# Patient Record
Sex: MALE | Race: WHITE
[De-identification: names, ages, dates, MRNs, and addresses within clinical notes are randomized per-mention and may not be internally consistent; named-entity substitution may affect disease eponyms.]

---

## 2018-08-28 ENCOUNTER — RECORD ABSTRACTING (OUTPATIENT)
Age: 58
End: 2018-08-28

## 2018-08-28 DIAGNOSIS — Z79.4 LONG TERM (CURRENT) USE OF INSULIN: ICD-10-CM

## 2018-08-28 DIAGNOSIS — Z72.3 LACK OF PHYSICAL EXERCISE: ICD-10-CM

## 2018-08-28 DIAGNOSIS — Z83.3 FAMILY HISTORY OF DIABETES MELLITUS: ICD-10-CM

## 2018-08-28 DIAGNOSIS — Z80.9 FAMILY HISTORY OF MALIGNANT NEOPLASM, UNSPECIFIED: ICD-10-CM

## 2018-08-28 LAB
HBA1C MFR BLD: 7.8
PODIATRY EVAL: NORMAL

## 2018-08-28 RX ORDER — BLOOD-GLUCOSE METER
W/DEVICE EACH MISCELLANEOUS
Refills: 0 | Status: ACTIVE | COMMUNITY

## 2018-08-31 ENCOUNTER — RX RENEWAL (OUTPATIENT)
Age: 58
End: 2018-08-31

## 2018-09-13 ENCOUNTER — APPOINTMENT (OUTPATIENT)
Dept: ENDOCRINOLOGY | Facility: CLINIC | Age: 58
End: 2018-09-13
Payer: MEDICARE

## 2018-09-13 VITALS
HEART RATE: 79 BPM | SYSTOLIC BLOOD PRESSURE: 124 MMHG | DIASTOLIC BLOOD PRESSURE: 80 MMHG | BODY MASS INDEX: 38.76 KG/M2 | HEIGHT: 75.5 IN | WEIGHT: 315 LBS

## 2018-09-13 DIAGNOSIS — Z87.39 PERSONAL HISTORY OF OTHER DISEASES OF THE MUSCULOSKELETAL SYSTEM AND CONNECTIVE TISSUE: ICD-10-CM

## 2018-09-13 DIAGNOSIS — M51.26 OTHER INTERVERTEBRAL DISC DISPLACEMENT, LUMBAR REGION: ICD-10-CM

## 2018-09-13 DIAGNOSIS — Z87.09 PERSONAL HISTORY OF OTHER DISEASES OF THE RESPIRATORY SYSTEM: ICD-10-CM

## 2018-09-13 DIAGNOSIS — Z87.19 PERSONAL HISTORY OF OTHER DISEASES OF THE DIGESTIVE SYSTEM: ICD-10-CM

## 2018-09-13 DIAGNOSIS — Z87.448 PERSONAL HISTORY OF OTHER DISEASES OF URINARY SYSTEM: ICD-10-CM

## 2018-09-13 DIAGNOSIS — R91.1 SOLITARY PULMONARY NODULE: ICD-10-CM

## 2018-09-13 DIAGNOSIS — I25.10 ATHEROSCLEROTIC HEART DISEASE OF NATIVE CORONARY ARTERY W/OUT ANGINA PECTORIS: ICD-10-CM

## 2018-09-13 LAB
CHOLEST SERPL-MCNC: 214
CREAT SERPL-MCNC: 0.81
GLUCOSE BLDC GLUCOMTR-MCNC: 223
GLUCOSE SERPL-MCNC: 225
HBA1C MFR BLD HPLC: 8.7
HDLC SERPL-MCNC: 36
LDLC SERPL DIRECT ASSAY-MCNC: 121
MICROALBUMIN/CREAT 24H UR-RTO: 396
TRIGL SERPL-MCNC: 396

## 2018-09-13 PROCEDURE — 99214 OFFICE O/P EST MOD 30 MIN: CPT | Mod: 25

## 2018-09-13 PROCEDURE — 82962 GLUCOSE BLOOD TEST: CPT

## 2018-09-13 RX ORDER — ALLOPURINOL 100 MG/1
100 TABLET ORAL
Refills: 0 | Status: DISCONTINUED | COMMUNITY
End: 2018-09-13

## 2018-09-13 RX ORDER — INSULIN ASPART 100 [IU]/ML
100 INJECTION, SOLUTION INTRAVENOUS; SUBCUTANEOUS
Refills: 0 | Status: DISCONTINUED | COMMUNITY
End: 2018-09-13

## 2018-09-13 RX ORDER — LIRAGLUTIDE 6 MG/ML
18 INJECTION SUBCUTANEOUS DAILY
Qty: 3 | Refills: 1 | Status: DISCONTINUED | COMMUNITY
Start: 2018-08-31 | End: 2018-09-13

## 2018-09-13 RX ORDER — INSULIN ADMIN. SUPPLIES
INSULIN PEN (EA) SUBCUTANEOUS
Refills: 0 | Status: DISCONTINUED | COMMUNITY
End: 2018-09-13

## 2018-10-11 ENCOUNTER — RX RENEWAL (OUTPATIENT)
Age: 58
End: 2018-10-11

## 2018-12-27 ENCOUNTER — RX RENEWAL (OUTPATIENT)
Age: 58
End: 2018-12-27

## 2019-01-07 ENCOUNTER — RX RENEWAL (OUTPATIENT)
Age: 59
End: 2019-01-07

## 2019-01-15 ENCOUNTER — RECORD ABSTRACTING (OUTPATIENT)
Age: 59
End: 2019-01-15

## 2019-01-22 ENCOUNTER — APPOINTMENT (OUTPATIENT)
Dept: ENDOCRINOLOGY | Facility: CLINIC | Age: 59
End: 2019-01-22

## 2019-02-12 ENCOUNTER — APPOINTMENT (OUTPATIENT)
Dept: ENDOCRINOLOGY | Facility: CLINIC | Age: 59
End: 2019-02-12
Payer: MEDICARE

## 2019-02-12 VITALS
HEART RATE: 80 BPM | DIASTOLIC BLOOD PRESSURE: 100 MMHG | WEIGHT: 315 LBS | HEIGHT: 75 IN | SYSTOLIC BLOOD PRESSURE: 150 MMHG | BODY MASS INDEX: 39.17 KG/M2

## 2019-02-12 VITALS — DIASTOLIC BLOOD PRESSURE: 90 MMHG | SYSTOLIC BLOOD PRESSURE: 140 MMHG

## 2019-02-12 LAB — GLUCOSE BLDC GLUCOMTR-MCNC: 152

## 2019-02-12 PROCEDURE — 99214 OFFICE O/P EST MOD 30 MIN: CPT | Mod: 25

## 2019-02-12 PROCEDURE — 82962 GLUCOSE BLOOD TEST: CPT

## 2019-02-12 RX ORDER — ERGOCALCIFEROL 1.25 MG/1
1.25 MG CAPSULE, LIQUID FILLED ORAL
Qty: 12 | Refills: 0 | Status: DISCONTINUED | COMMUNITY
Start: 2018-12-27 | End: 2019-02-12

## 2019-02-12 RX ORDER — LIRAGLUTIDE 6 MG/ML
18 INJECTION SUBCUTANEOUS
Refills: 0 | Status: DISCONTINUED | COMMUNITY
End: 2019-02-12

## 2019-02-12 RX ORDER — EMPAGLIFLOZIN 10 MG/1
10 TABLET, FILM COATED ORAL DAILY
Qty: 30 | Refills: 5 | Status: DISCONTINUED | COMMUNITY
Start: 2018-09-13 | End: 2019-02-12

## 2019-02-12 RX ORDER — CHOLECALCIFEROL (VITAMIN D3) 125 MCG
TABLET ORAL
Refills: 0 | Status: DISCONTINUED | COMMUNITY
End: 2019-02-12

## 2019-02-12 RX ORDER — HYDRALAZINE HYDROCHLORIDE 25 MG/1
25 TABLET ORAL
Refills: 0 | Status: DISCONTINUED | COMMUNITY
End: 2019-02-12

## 2019-02-12 RX ORDER — NIFEDIPINE 30 MG/1
30 TABLET, FILM COATED, EXTENDED RELEASE ORAL
Refills: 0 | Status: DISCONTINUED | COMMUNITY
End: 2019-02-12

## 2019-02-12 RX ORDER — SYRINGE AND NEEDLE,INSULIN,1ML 31 GX5/16"
SYRINGE, EMPTY DISPOSABLE MISCELLANEOUS
Refills: 0 | Status: DISCONTINUED | COMMUNITY
End: 2019-02-12

## 2019-02-12 RX ORDER — ERGOCALCIFEROL 1.25 MG/1
CAPSULE ORAL
Refills: 0 | Status: DISCONTINUED | COMMUNITY
End: 2019-02-12

## 2019-02-12 RX ORDER — PEN NEEDLE, DIABETIC 33 GX5/32"
NEEDLE, DISPOSABLE MISCELLANEOUS
Refills: 0 | Status: DISCONTINUED | COMMUNITY
End: 2019-02-12

## 2019-02-12 NOTE — REVIEW OF SYSTEMS
[Decreased Appetite] : ~L decreased appetite [Recent Weight Loss (___ Lbs)] : recent [unfilled] ~Ulb weight loss [SOB on Exertion] : shortness of breath during exertion [Polydipsia] : polydipsia [Fatigue] : no fatigue [Visual Field Defect] : no visual field defect [Blurry Vision] : no blurred vision [Dysphagia] : no dysphagia [Dysphonia] : no dysphonia [Neck Pain] : no neck pain [Chest Pain] : no chest pain [Palpitations] : no palpitations [Constipation] : no constipation [Diarrhea] : no diarrhea [Polyuria] : no polyuria [Dysuria] : no dysuria [Headache] : no headaches [Tremors] : no tremors [Depression] : no depression [Anxiety] : no anxiety [Cold Intolerance] : cold tolerant [Heat Intolerance] : heat tolerant [Easy Bruising] : no tendency for easy bruising [Swelling] : no swelling [FreeTextEntry5] : chest tightness come and goes, has appointment with Cardiology tomorrow [FreeTextEntry8] : on lasix

## 2019-02-12 NOTE — PHYSICAL EXAM
[Alert] : alert [Well Nourished] : well nourished [Well Developed] : well developed [EOMI] : extra ocular movement intact [Normal Hearing] : hearing was normal [Supple] : the neck was supple [No LAD] : no lymphadenopathy [Thyroid Not Enlarged] : the thyroid was not enlarged [No Thyroid Nodules] : there were no palpable thyroid nodules [Normal Rate and Effort] : normal respiratory rhythm and effort [No Accessory Muscle Use] : no accessory muscle use [Clear to Auscultation] : lungs were clear to auscultation bilaterally [Normal Rate] : heart rate was normal  [Normal S1, S2] : normal S1 and S2 [Regular Rhythm] : with a regular rhythm [Pedal Pulses Normal] : the pedal pulses are present [Normal Bowel Sounds] : normal bowel sounds [Not Tender] : non-tender [Soft] : abdomen soft [Normal Gait] : normal gait [Right Foot Was Examined] : right foot ~C was examined [Left Foot Was Examined] : left foot ~C was examined [Normal] : normal [#2 Diminished] : number 2 was diminished [No Tremors] : no tremors [Oriented x3] : oriented to person, place, and time [Normal Insight/Judgement] : insight and judgment were intact [Acanthosis Nigricans] : no acanthosis nigricans [#5 Diminished] : number 5 was normal [#4 Diminished] : number 4 was normal [#6 Diminished] : number 6 was normal [#7 Diminished] : number 7 was normal [#8 Diminished] : number 8 was normal [#9 Diminished] : number 9 was normal [de-identified] : 1+ edema in bilateral feet [de-identified] : obese [FreeTextEntry5] : 2 cm open ulcer on upper anterior foot  [FreeTextEntry6] : 1 st toe with 1 cm  closed ulcer

## 2019-02-12 NOTE — HISTORY OF PRESENT ILLNESS
[FreeTextEntry1] : Pt. reports 2 weeks ago he had a heart attack along with 3 stents placed at the time. He is currently being followed by cardiology. \par \par Quality: Type 2 DM\par Severity: moderate\par Duration: since \par Onset: fatigue, blurry vision\par Modifying Factors: Better with medication\par Associated Symptoms: neuropathy. nephropathy\par \par Current Regimen:\par Humalog gives 10 units 2 hours after meals if blood sugar is high\par Tresiba 80 units at HS\par Victoza 1.2 mg daily\par \par Self blood sugar monitorin-3 times a day, no meter, no logs\par per pt. blood sugar between 120-150\par \par exercise: none\par \par Diet:\par B- skips, banana\par L- skips\par D- chicken, veggie violeta\par Snacks- apple\par \par Date of last eye exam:  (-) diabetic retinopathy\par Date of last foot exam: 2018, appointment in 2 weeks\par Date of last flu vaccine: 2018\par Date of last Pneumovax: no

## 2019-02-12 NOTE — REASON FOR VISIT
[Follow-Up: _____] : a [unfilled] follow-up visit [Spouse] : spouse [FreeTextEntry1] : Type 2 DM, Hyperlipidemia, HTN, vitamin D Deficiency, Obesity, and Hashimoto's thyroiditis

## 2019-02-12 NOTE — ASSESSMENT
[FreeTextEntry1] : 57 y/o male with Type 2 DM, Hyperlipidemia, HTN, vitamin D Deficiency, Obesity, and Hashimoto's thyroiditis. Labs reviewed from 1/30/19 - chol 177, A1C 9.3%, \par \par Plan: \par Type 2 DM: start Humalog U 200 give 5 units before meals - sample given\par - ordered Novolog in place of Humalog r/t insurance coverage \par - continue Tresiba 80 units at bedtime and Victoza 1.2 mg daily\par - continue self BS monitoring\par - send in logs in 1 week\par - offered [employer] Professional, pt. refused\par \par Obesity: educated on healthy food choices\par - schedule appointment with CDE for diet education\par - encouraged to increase routine exercise\par \par Hyperlipidemia: monitor labs, continue Atorvastatin\par \par HTN: elevated repeat /90, pt. has appointment scheduled with Cardiologist tomorrow\par - continue current medication regimen\par \par vitamin D deficiency: monitor labs, continue vitamin D supplement \par   \par Hashimoto's thyroiditis: monitor TFTs, no medication recommended at this time \par \par Pt. is followed by podiatry for foot ulcers. \par \par Labs and follow up visit in 3 weeks.\par \par Plan reviewed with Dr. Reyes.

## 2019-02-13 ENCOUNTER — RX RENEWAL (OUTPATIENT)
Age: 59
End: 2019-02-13

## 2019-02-21 ENCOUNTER — RECORD ABSTRACTING (OUTPATIENT)
Age: 59
End: 2019-02-21

## 2019-03-05 ENCOUNTER — APPOINTMENT (OUTPATIENT)
Dept: ENDOCRINOLOGY | Facility: CLINIC | Age: 59
End: 2019-03-05
Payer: MEDICARE

## 2019-03-05 VITALS
WEIGHT: 315 LBS | DIASTOLIC BLOOD PRESSURE: 90 MMHG | HEART RATE: 93 BPM | SYSTOLIC BLOOD PRESSURE: 144 MMHG | HEIGHT: 75 IN | BODY MASS INDEX: 39.17 KG/M2

## 2019-03-05 DIAGNOSIS — Z00.00 ENCOUNTER FOR GENERAL ADULT MEDICAL EXAMINATION W/OUT ABNORMAL FINDINGS: ICD-10-CM

## 2019-03-05 LAB — GLUCOSE BLDC GLUCOMTR-MCNC: 157

## 2019-03-05 PROCEDURE — 99214 OFFICE O/P EST MOD 30 MIN: CPT | Mod: 25

## 2019-03-05 PROCEDURE — 82962 GLUCOSE BLOOD TEST: CPT

## 2019-03-05 PROCEDURE — 95250 CONT GLUC MNTR PHYS/QHP EQP: CPT

## 2019-03-05 RX ORDER — INSULIN LISPRO 200 [IU]/ML
200 INJECTION, SOLUTION SUBCUTANEOUS
Qty: 1 | Refills: 0 | Status: DISCONTINUED | OUTPATIENT
End: 2019-03-05

## 2019-03-05 RX ORDER — HYDRALAZINE HYDROCHLORIDE 50 MG/1
50 TABLET ORAL 3 TIMES DAILY
Refills: 0 | Status: ACTIVE | COMMUNITY

## 2019-03-05 RX ORDER — FLASH GLUCOSE SENSOR
KIT MISCELLANEOUS
Qty: 0 | Refills: 0 | Status: COMPLETED | OUTPATIENT
Start: 2019-03-05

## 2019-03-05 RX ORDER — ERGOCALCIFEROL 1.25 MG/1
1.25 MG CAPSULE ORAL
Refills: 0 | Status: DISCONTINUED | COMMUNITY
End: 2019-03-05

## 2019-03-05 RX ORDER — NIFEDIPINE 30 MG/1
30 TABLET, FILM COATED, EXTENDED RELEASE ORAL
Refills: 0 | Status: DISCONTINUED | COMMUNITY
End: 2019-03-05

## 2019-03-05 RX ORDER — CHOLECALCIFEROL (VITAMIN D3) 125 MCG
TABLET ORAL
Refills: 0 | Status: DISCONTINUED | COMMUNITY
End: 2019-03-05

## 2019-03-05 RX ORDER — CLONIDINE HYDROCHLORIDE 0.2 MG/1
0.2 TABLET ORAL
Refills: 0 | Status: DISCONTINUED | COMMUNITY
End: 2019-03-05

## 2019-03-05 RX ADMIN — Medication 0: at 00:00

## 2019-03-05 NOTE — HISTORY OF PRESENT ILLNESS
[FreeTextEntry1] : 19 - Pt. reports 2 weeks ago he had a heart attack along with 3 stents placed at the time. He is currently being followed by cardiology. \par \par 3/5/19 - Pt. reports feeling better. \par \par Quality: Type 2 DM\par Severity: moderate\par Duration: since \par Onset: fatigue, blurry vision\par Modifying Factors: Better with medication\par Associated Symptoms: neuropathy. nephropathy\par \par Current Regimen:\par Humalog gives 5-7 units if BS > 100\par Tresiba 80 units at HS\par Victoza 1.2 mg daily\par \par Self blood sugar monitorin-3 times a day,per logs\par before breakfast - 155, 125, 162, 138, 134, 123, 138, 104\par before lunch - 140\par before dinner - 109, 176, 135, 108\par after dinner - 168\par \par \par exercise: none\par \par Diet:\par B- skips, banana\par L- sometimes skips, salad \par D- chicken, veggie violeta\par Snacks- apple\par \par Date of last eye exam:  (-) diabetic retinopathy\par Date of last foot exam: 2018, appointment in 2 weeks\par Date of last flu vaccine: 2018\par Date of last Pneumovax: no

## 2019-03-05 NOTE — PHYSICAL EXAM
[Alert] : alert [No Acute Distress] : no acute distress [Well Nourished] : well nourished [Well Developed] : well developed [EOMI] : extra ocular movement intact [Normal Hearing] : hearing was normal [Supple] : the neck was supple [No LAD] : no lymphadenopathy [Thyroid Not Enlarged] : the thyroid was not enlarged [No Thyroid Nodules] : there were no palpable thyroid nodules [Normal Rate and Effort] : normal respiratory rhythm and effort [No Accessory Muscle Use] : no accessory muscle use [Clear to Auscultation] : lungs were clear to auscultation bilaterally [Normal Rate] : heart rate was normal  [Normal S1, S2] : normal S1 and S2 [Regular Rhythm] : with a regular rhythm [Pedal Pulses Normal] : the pedal pulses are present [Normal Bowel Sounds] : normal bowel sounds [Not Tender] : non-tender [Soft] : abdomen soft [Normal Gait] : normal gait [Acanthosis Nigricans] : no acanthosis nigricans [Right Foot Was Examined] : right foot ~C was examined [Left Foot Was Examined] : left foot ~C was examined [Normal] : normal [#6 Diminished] : number 6 was normal [#8 Diminished] : number 8 was normal [#2 Diminished] : number 2 was diminished [#4 Diminished] : number 4 was diminished [#5 Diminished] : number 5 was diminished [#7 Diminished] : number 7 was diminished [#9 Diminished] : number 9 was diminished [No Motor Deficits] : the motor exam was normal [No Tremors] : no tremors [Oriented x3] : oriented to person, place, and time [Normal Insight/Judgement] : insight and judgment were intact [Normal Mood] : the mood was normal [de-identified] : 1+ edema in bilateral feet [de-identified] : obese [FreeTextEntry5] : 2 cm open ulcer on upper anterior foot  [FreeTextEntry6] : 1 st toe with 1 cm  closed ulcer

## 2019-03-05 NOTE — REASON FOR VISIT
[Follow-Up: _____] : a [unfilled] follow-up visit [Spouse] : spouse [FreeTextEntry1] : Type 2 DM, vitamin D Deficiency, Hyperlipidemia, and HTN.

## 2019-03-05 NOTE — REVIEW OF SYSTEMS
[Fatigue] : fatigue [Decreased Appetite] : ~L decreased appetite [Recent Weight Loss (___ Lbs)] : recent [unfilled] ~Ulb weight loss [Chest Pain] : chest pain [Palpitations] : palpitations [SOB on Exertion] : shortness of breath during exertion [Visual Field Defect] : no visual field defect [Blurry Vision] : no blurred vision [Dysphagia] : no dysphagia [Dysphonia] : no dysphonia [Constipation] : no constipation [Diarrhea] : no diarrhea [Polyuria] : no polyuria [Dysuria] : no dysuria [Headache] : no headaches [Tremors] : no tremors [Depression] : no depression [Anxiety] : no anxiety [Polydipsia] : no polydipsia [Cold Intolerance] : cold tolerant [Heat Intolerance] : heat tolerant [Easy Bruising] : no tendency for easy bruising [Swelling] : no swelling [FreeTextEntry4] : posterior neck pain r/t statins  [FreeTextEntry5] : improving s/p 3 stents placements

## 2019-03-05 NOTE — ASSESSMENT
[FreeTextEntry1] : 57 y/o male with Type 2 DM, Hyperlipidemia, HTN, vitamin D Deficiency, Obesity, and Hashimoto's thyroiditis. Labs reviewed from 2/28/19 - chol 95, A1C 8.5%, vitamin D 18, , and Microalbumin 763\par \par Plan:  \par Microalbuminuria: referral given for nephrology consult\par  \par Type 2 DM: eliza professional placed \par - ordered eliza personal sensor and reader \par - continue Novolog give 5 units before meals \par - continue Tresiba 80 units at bedtime and Victoza 1.2 mg daily\par - continue self BS monitoring\par \par Obesity: educated on healthy food choices\par - encouraged to increase routine exercise\par \par Hyperlipidemia: monitor labs, continue Atorvastatin\par \par HTN: continue current medication regimen\par \par vitamin D deficiency: monitor labs, per pt. cardiologist does not want him taking vitamin D   \par   \par Hashimoto's thyroiditis: monitor TFTs, no medication recommended at this time \par \par Pt. is followed by podiatry for foot ulcers. \par \par Follow up visit in 2 weeks.

## 2019-03-11 ENCOUNTER — RX RENEWAL (OUTPATIENT)
Age: 59
End: 2019-03-11

## 2019-03-11 ENCOUNTER — CLINICAL ADVICE (OUTPATIENT)
Age: 59
End: 2019-03-11

## 2019-03-11 ENCOUNTER — MEDICATION RENEWAL (OUTPATIENT)
Age: 59
End: 2019-03-11

## 2019-03-21 ENCOUNTER — APPOINTMENT (OUTPATIENT)
Dept: ENDOCRINOLOGY | Facility: CLINIC | Age: 59
End: 2019-03-21
Payer: MEDICARE

## 2019-03-21 VITALS
OXYGEN SATURATION: 97 % | BODY MASS INDEX: 39.17 KG/M2 | DIASTOLIC BLOOD PRESSURE: 80 MMHG | HEIGHT: 75 IN | SYSTOLIC BLOOD PRESSURE: 142 MMHG | WEIGHT: 315 LBS | HEART RATE: 78 BPM

## 2019-03-21 LAB — GLUCOSE BLDC GLUCOMTR-MCNC: 161

## 2019-03-21 PROCEDURE — 99214 OFFICE O/P EST MOD 30 MIN: CPT | Mod: 25

## 2019-03-21 PROCEDURE — 95251 CONT GLUC MNTR ANALYSIS I&R: CPT

## 2019-03-21 NOTE — HISTORY OF PRESENT ILLNESS
[FreeTextEntry1] : 6 weeks ago pt. had a heart attack along with 3 stents placed at the time. He is currently being followed by cardiology. \par \par Quality: Type 2 DM\par Severity: moderate\par Duration: since \par Onset: fatigue, blurry vision\par Modifying Factors: Better with medication\par Associated Symptoms: neuropathy. nephropathy\par \par Current Regimen:\par Humalog gives 5-7 units if BS > 100\par Tresiba 80 units at HS\par Victoza 1.2 mg daily\par Glimepiride 4 mg BID\par Metformin  mg 2 tabs BID\par \par Self blood sugar monitorin-3 times a day,per logs\par before breakfast - 148, 126, 122, 115, 192, 184, 156\par before lunch - 132, 179, 122, 160, 150, 160\par before dinner - 100, 92, 132, 190\par after dinner - 97, 146, 171\par \par Admazely Professional download - average glucose 198, standard deviation 67.4, highs after dinner and overnights\par Pt. reports of having a large dinner and snacking before bed\par \par \par exercise: none\par \par Diet: same\par B- skips, banana\par L- sometimes skips, salad \par D- chicken, veggie violeta\par Snacks- apple\par \par Date of last eye exam:  (-) diabetic retinopathy\par Date of last foot exam: 2018, appointment in 2 weeks\par Date of last flu vaccine: 2018\par Date of last Pneumovax: no

## 2019-03-21 NOTE — REVIEW OF SYSTEMS
[Fatigue] : fatigue [Recent Weight Loss (___ Lbs)] : recent [unfilled] ~Ulb weight loss [Chest Pain] : chest pain [Palpitations] : palpitations [SOB on Exertion] : shortness of breath during exertion [Easy Bruising] : a tendency for easy bruising [Decreased Appetite] : appetite not decreased [Visual Field Defect] : no visual field defect [Blurry Vision] : no blurred vision [Dysphagia] : no dysphagia [Dysphonia] : no dysphonia [Neck Pain] : no neck pain [Constipation] : no constipation [Diarrhea] : no diarrhea [Polyuria] : no polyuria [Dysuria] : no dysuria [Headache] : no headaches [Tremors] : no tremors [Depression] : no depression [Anxiety] : no anxiety [Polydipsia] : no polydipsia [Cold Intolerance] : cold tolerant [Heat Intolerance] : heat tolerant [Swelling] : no swelling [FreeTextEntry5] : improved, followed by cardiology  [de-identified] : on Brilinta

## 2019-03-21 NOTE — PHYSICAL EXAM
[Alert] : alert [No Acute Distress] : no acute distress [Well Nourished] : well nourished [Well Developed] : well developed [EOMI] : extra ocular movement intact [Normal Hearing] : hearing was normal [Supple] : the neck was supple [No LAD] : no lymphadenopathy [Thyroid Not Enlarged] : the thyroid was not enlarged [No Thyroid Nodules] : there were no palpable thyroid nodules [Normal Rate and Effort] : normal respiratory rhythm and effort [No Accessory Muscle Use] : no accessory muscle use [Clear to Auscultation] : lungs were clear to auscultation bilaterally [Normal Rate] : heart rate was normal  [Normal S1, S2] : normal S1 and S2 [Regular Rhythm] : with a regular rhythm [Pedal Pulses Normal] : the pedal pulses are present [Normal Bowel Sounds] : normal bowel sounds [Not Tender] : non-tender [Soft] : abdomen soft [Normal Gait] : normal gait [Acanthosis Nigricans] : no acanthosis nigricans [Right Foot Was Examined] : right foot ~C was examined [Left Foot Was Examined] : left foot ~C was examined [Normal] : normal [#6 Diminished] : number 6 was normal [#8 Diminished] : number 8 was normal [#2 Diminished] : number 2 was diminished [#4 Diminished] : number 4 was diminished [#5 Diminished] : number 5 was diminished [#7 Diminished] : number 7 was diminished [#9 Diminished] : number 9 was diminished [No Motor Deficits] : the motor exam was normal [No Tremors] : no tremors [Oriented x3] : oriented to person, place, and time [Normal Insight/Judgement] : insight and judgment were intact [Normal Mood] : the mood was normal [de-identified] : 1+ edema in bilateral feet [de-identified] : obese [FreeTextEntry5] : 2 cm open ulcer on upper anterior foot  [FreeTextEntry6] : 1 st toe with 1 cm  closed ulcer

## 2019-03-21 NOTE — REASON FOR VISIT
[Follow-Up: _____] : a [unfilled] follow-up visit [Spouse] : spouse [FreeTextEntry1] : Type 2 DM, Hyperlipidemia, HTN, and Vitamin D Deficiency.

## 2019-03-21 NOTE — ASSESSMENT
[FreeTextEntry1] : 57 y/o male with Type 2 DM, Hyperlipidemia, HTN, vitamin D Deficiency, Obesity, and Hashimoto's thyroiditis. Labs from 2/28/19 - chol 95, A1C 8.5%, vitamin D 18, , and Microalbumin 763\par \par Plan:  \par Microalbuminuria: referral given for nephrology consult\par  \par Type 2 DM:\par - adjusted Novolog to 5 units before breakfast, 6 units before lunch and 8 units before dinner \par - continue Tresiba 80 units at bedtime, Victoza 1.2 mg daily, Glimepiride 4 mg BID\par Metformin  mg 2 tabs BID\par - continue self BS monitoring\par - send in logs in 1 week\par \par Obesity: educated on healthy food choices\par - encouraged to increase routine exercise\par \par Hyperlipidemia: monitor labs, continue Atorvastatin\par \par HTN: continue current medication regimen\par \par vitamin D deficiency: monitor labs, per pt. cardiologist does not want him taking vitamin D   \par   \par Hashimoto's thyroiditis: monitor TFTs, no medication recommended at this time \par \par Pt. is followed by podiatry for foot ulcers. \par \par Follow up visit in 2 months with Dr. Reyes

## 2019-03-27 ENCOUNTER — RX RENEWAL (OUTPATIENT)
Age: 59
End: 2019-03-27

## 2019-03-27 ENCOUNTER — MEDICATION RENEWAL (OUTPATIENT)
Age: 59
End: 2019-03-27

## 2019-04-03 ENCOUNTER — RX RENEWAL (OUTPATIENT)
Age: 59
End: 2019-04-03

## 2019-04-19 ENCOUNTER — RESULT REVIEW (OUTPATIENT)
Age: 59
End: 2019-04-19

## 2019-05-09 ENCOUNTER — APPOINTMENT (OUTPATIENT)
Dept: ENDOCRINOLOGY | Facility: CLINIC | Age: 59
End: 2019-05-09
Payer: MEDICARE

## 2019-05-09 VITALS
HEIGHT: 75 IN | SYSTOLIC BLOOD PRESSURE: 130 MMHG | DIASTOLIC BLOOD PRESSURE: 80 MMHG | WEIGHT: 315 LBS | BODY MASS INDEX: 39.17 KG/M2 | HEART RATE: 79 BPM

## 2019-05-09 DIAGNOSIS — Z98.61 ATHEROSCLEROTIC HEART DISEASE OF NATIVE CORONARY ARTERY W/OUT ANGINA PECTORIS: ICD-10-CM

## 2019-05-09 DIAGNOSIS — I25.10 ATHEROSCLEROTIC HEART DISEASE OF NATIVE CORONARY ARTERY W/OUT ANGINA PECTORIS: ICD-10-CM

## 2019-05-09 LAB — GLUCOSE BLDC GLUCOMTR-MCNC: 119

## 2019-05-09 PROCEDURE — 82962 GLUCOSE BLOOD TEST: CPT

## 2019-05-09 PROCEDURE — 99214 OFFICE O/P EST MOD 30 MIN: CPT | Mod: 25

## 2019-05-09 RX ORDER — NIFEDIPINE 30 MG/1
30 TABLET, EXTENDED RELEASE ORAL DAILY
Qty: 90 | Refills: 1 | Status: ACTIVE | COMMUNITY

## 2019-05-09 NOTE — REVIEW OF SYSTEMS
[Recent Weight Loss (___ Lbs)] : recent [unfilled] ~Ulb weight loss [Chest Pain] : chest pain [Pain/Numbness of Digits] : pain/numbness of digits [Polyuria] : polyuria [Shortness Of Breath] : no shortness of breath [Nausea] : no nausea

## 2019-05-09 NOTE — PHYSICAL EXAM
[No Acute Distress] : no acute distress [Normal Sclera/Conjunctiva] : normal sclera/conjunctiva [No Proptosis] : no proptosis [No Neck Mass] : no neck mass was observed [No LAD] : no lymphadenopathy [Thyroid Not Enlarged] : the thyroid was not enlarged [No Thyroid Nodules] : there were no palpable thyroid nodules [Normal Rate and Effort] : normal respiratory rhythm and effort [Normal Rate] : heart rate was normal  [Clear to Auscultation] : lungs were clear to auscultation bilaterally [Regular Rhythm] : with a regular rhythm [Normal S1, S2] : normal S1 and S2 [No Edema] : there was no peripheral edema [Normal Gait] : normal gait [Normal Insight/Judgement] : insight and judgment were intact [Normal Mood] : the mood was normal [Normal Affect] : the affect was normal [Acanthosis Nigricans] : no acanthosis nigricans [de-identified] : Obese male [de-identified] : 2/6 systolic murmur [de-identified] : trace edema b/l

## 2019-05-09 NOTE — DATA REVIEWED
[FreeTextEntry1] : LABS:\par 5/2/2019:\par Microalbumin/ Creatinine:  328\par LDL 50\par TSH  2.06\par A1c 7.5\par 25(OH)D  17

## 2019-05-09 NOTE — HISTORY OF PRESENT ILLNESS
[FreeTextEntry1] : Patient is seen today for a routine follow up of type 2 DM, Hashimoto's Thyroiditis, hyperlipidemia.  \par Quality:  type 2 DM\par Severity:  moderate\par Duration of diabetes:  since 2009\par Associated Complications/ Symptoms:  nephropathy and neuropathy, CAD\par Modifying Factors:  Better with medication\par \par Patient tests blood glucose 3-4  times per day.  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  HAs often been skipping lunch.  \par \par Current Diabetic Medication Regimen:\par  Metformin ER 1000 mg BID\par Glimepiride 4 mg BID\par Victoza 1.2 mg daily (does not want to increase dose due to cost).  \par Tresiba 80 units qhs\par Novolog 7/7/8\par Intolerant to Invokana (fatigue), Trulicity (chest pain and arthralgias).\par Intolerant to ARB (angioedema) and statins.\par \par

## 2019-05-09 NOTE — CONSULT LETTER
[Dear  ___] : Dear  [unfilled], [Courtesy Letter:] : I had the pleasure of seeing your patient, [unfilled], in my office today. [Please see my note below.] : Please see my note below. [Consult Closing:] : Thank you very much for allowing me to participate in the care of this patient.  If you have any questions, please do not hesitate to contact me. [Sincerely,] : Sincerely, [FreeTextEntry3] : Tanner Reyes MD, FACE\par

## 2019-05-09 NOTE — ASSESSMENT
[FreeTextEntry1] : 58 year old male with type 2 DM with associated proteinuria and CAD, Hashimoto's Thyroiditis, HTN, hyperlipidemia, and vitamin D deficiency.  His glycemic control is improving.  \par \par 1.  Type 2 DM-   Eat consistent lunch and reduce lunchtime dose of Novolog to 4 units to prevent late afternoon hypoglycemia.  \par 2.  Hashimoto's Thyroiditis-  has remained euthyroid. Continue to monitor TFTs.\par 3.  HTN-  as per cardiology.  Cannot tolerate ARB.\par 4.  Hyperlipidemia-   continue Atorvastatin.  \par 5.  Vitamin D deficiency-   resume Vitamin D2 50,000 IU once a week.

## 2019-07-15 ENCOUNTER — APPOINTMENT (OUTPATIENT)
Dept: ENDOCRINOLOGY | Facility: CLINIC | Age: 59
End: 2019-07-15
Payer: MEDICARE

## 2019-07-15 ENCOUNTER — RESULT CHARGE (OUTPATIENT)
Age: 59
End: 2019-07-15

## 2019-07-15 VITALS
WEIGHT: 315 LBS | HEART RATE: 80 BPM | DIASTOLIC BLOOD PRESSURE: 82 MMHG | HEIGHT: 75 IN | BODY MASS INDEX: 39.17 KG/M2 | SYSTOLIC BLOOD PRESSURE: 134 MMHG

## 2019-07-15 LAB — GLUCOSE BLDC GLUCOMTR-MCNC: 129

## 2019-07-15 PROCEDURE — 82962 GLUCOSE BLOOD TEST: CPT

## 2019-07-15 PROCEDURE — 99214 OFFICE O/P EST MOD 30 MIN: CPT | Mod: 25

## 2019-07-15 NOTE — REASON FOR VISIT
[Follow-Up: _____] : a [unfilled] follow-up visit [Spouse] : spouse [FreeTextEntry1] : Type 2 DM, HTN, Obesity, Hyperlipidemia, and Thyroiditis

## 2019-07-15 NOTE — ASSESSMENT
[FreeTextEntry1] : 57 y/o male with Type 2 DM, Hyperlipidemia, HTN, Obesity, and Hashimoto's thyroiditis. Labs reviewed from 7/10/19 - chol 105, A1C 6.7%,  , and Microalbumin 411\par \par Plan:  \par Type 2 DM: A1C improving \par - decrease Glimepiride 4 mg to once a day - to prevent lows\par - continue Tresiba 70 units at bedtime and Victoza 1.2 mg daily, Metformin  mg 2 tabs BID\par - continue self BS monitoring\par - send in logs in 1 week\par - schedule appointment with CDE for diet education \par - follow up with podiatry \par \par Obesity: educated on healthy food choices\par - encouraged to increase routine exercise\par \par Hyperlipidemia: monitor lipids, continue Atorvastatin\par \par HTN: stable, continue current medication regimen\par   \par Hashimoto's thyroiditis: monitor TFTs, no medication recommended at this time \par \par Follow up visit in 2 months with Dr. Reyes [Diabetes Foot Care] : diabetes foot care [Importance of Diet and Exercise] : importance of diet and exercise to improve glycemic control, achieve weight loss and improve cardiovascular health

## 2019-07-15 NOTE — REVIEW OF SYSTEMS
[Fatigue] : fatigue [Decreased Appetite] : ~L decreased appetite [Recent Weight Loss (___ Lbs)] : recent [unfilled] ~Ulb weight loss [Visual Field Defect] : visual field defect [Palpitations] : palpitations [SOB on Exertion] : shortness of breath during exertion [Polydipsia] : polydipsia [Easy Bruising] : a tendency for easy bruising [Blurry Vision] : no blurred vision [Dysphagia] : no dysphagia [Dysphonia] : no dysphonia [Neck Pain] : no neck pain [Chest Pain] : no chest pain [Constipation] : no constipation [Diarrhea] : no diarrhea [Polyuria] : no polyuria [Dysuria] : no dysuria [Headache] : no headaches [Tremors] : no tremors [Depression] : no depression [Anxiety] : no anxiety [Cold Intolerance] : cold tolerant [Heat Intolerance] : heat tolerant [Swelling] : no swelling [FreeTextEntry5] : followed by cardiology

## 2019-07-15 NOTE — HISTORY OF PRESENT ILLNESS
[FreeTextEntry1] : Quality: Type 2 DM\par Severity: moderate\par Duration: since \par Onset: fatigue, blurry vision\par Modifying Factors: Better with medication\par Associated Symptoms: neuropathy. nephropathy\par \par Current Regimen:\par Tresiba 70 units at HS\par Victoza 1.2 mg daily\par Glimepiride 4 mg BID\par Metformin  mg 2 tabs BID\par \par - stopped Novolog r/t low blood sugars \par \par Self blood sugar monitorin-3 times a day,per logs\par before breakfast - 126, 133, 126, 130, 131\par after lunch - 132, 124\par before dinner - 94\par after dinner - 103\par \par exercise: none\par \par Diet:\par B- banana\par L- banana, yogurt, salad \par D- chicken, veggie violeta\par Snacks- apple\par \par Date of last eye exam: 2018 (-) diabetic retinopathy\par Date of last foot exam: 2019\par Date of last flu vaccine: 2018\par Date of last Pneumovax: no

## 2019-07-15 NOTE — PHYSICAL EXAM
[Alert] : alert [No Acute Distress] : no acute distress [Well Nourished] : well nourished [Well Developed] : well developed [EOMI] : extra ocular movement intact [Normal Hearing] : hearing was normal [Supple] : the neck was supple [No LAD] : no lymphadenopathy [Thyroid Not Enlarged] : the thyroid was not enlarged [No Thyroid Nodules] : there were no palpable thyroid nodules [Normal Rate and Effort] : normal respiratory rhythm and effort [No Accessory Muscle Use] : no accessory muscle use [Clear to Auscultation] : lungs were clear to auscultation bilaterally [Normal Rate] : heart rate was normal  [Normal S1, S2] : normal S1 and S2 [Regular Rhythm] : with a regular rhythm [Pedal Pulses Normal] : the pedal pulses are present [Normal Bowel Sounds] : normal bowel sounds [Not Tender] : non-tender [Soft] : abdomen soft [Normal Gait] : normal gait [Acanthosis Nigricans] : no acanthosis nigricans [Right Foot Was Examined] : right foot ~C was examined [Left Foot Was Examined] : left foot ~C was examined [Normal] : normal [#6 Diminished] : number 6 was normal [#8 Diminished] : number 8 was normal [#2 Diminished] : number 2 was diminished [#4 Diminished] : number 4 was diminished [#5 Diminished] : number 5 was diminished [#7 Diminished] : number 7 was diminished [#9 Diminished] : number 9 was diminished [No Motor Deficits] : the motor exam was normal [No Tremors] : no tremors [Oriented x3] : oriented to person, place, and time [Normal Insight/Judgement] : insight and judgment were intact [Normal Mood] : the mood was normal [de-identified] : 1+ edema in bilateral feet [de-identified] : obese [FreeTextEntry5] : 2 cm open ulcer on upper anterior foot  [FreeTextEntry6] : 2nd toe with bandaide covering removal of the tip of toe, removed by Dr. Marroquin

## 2019-07-18 ENCOUNTER — RX RENEWAL (OUTPATIENT)
Age: 59
End: 2019-07-18

## 2019-08-19 ENCOUNTER — RX RENEWAL (OUTPATIENT)
Age: 59
End: 2019-08-19

## 2019-09-06 ENCOUNTER — APPOINTMENT (OUTPATIENT)
Dept: ENDOCRINOLOGY | Facility: CLINIC | Age: 59
End: 2019-09-06
Payer: MEDICARE

## 2019-09-06 VITALS
HEART RATE: 75 BPM | WEIGHT: 315 LBS | DIASTOLIC BLOOD PRESSURE: 80 MMHG | HEIGHT: 75 IN | SYSTOLIC BLOOD PRESSURE: 130 MMHG | BODY MASS INDEX: 39.17 KG/M2

## 2019-09-06 LAB
GLUCOSE BLDC GLUCOMTR-MCNC: 147
GLUCOSE SERPL-MCNC: 118
HBA1C MFR BLD HPLC: 6.7
LDLC SERPL DIRECT ASSAY-MCNC: 45
MICROALBUMIN/CREAT 24H UR-RTO: 411

## 2019-09-06 PROCEDURE — 99214 OFFICE O/P EST MOD 30 MIN: CPT | Mod: 25

## 2019-09-06 PROCEDURE — 82962 GLUCOSE BLOOD TEST: CPT

## 2019-09-06 RX ORDER — ATORVASTATIN CALCIUM 80 MG/1
80 TABLET, FILM COATED ORAL
Refills: 0 | Status: DISCONTINUED | COMMUNITY
End: 2019-09-06

## 2019-09-06 RX ORDER — FLASH GLUCOSE SCANNING READER
EACH MISCELLANEOUS
Qty: 1 | Refills: 3 | Status: DISCONTINUED | COMMUNITY
Start: 2019-03-05 | End: 2019-09-06

## 2019-09-06 RX ORDER — FLASH GLUCOSE SENSOR
KIT MISCELLANEOUS
Qty: 6 | Refills: 2 | Status: DISCONTINUED | COMMUNITY
Start: 2019-03-05 | End: 2019-09-06

## 2019-09-06 RX ORDER — INSULIN DEGLUDEC INJECTION 200 U/ML
200 INJECTION, SOLUTION SUBCUTANEOUS
Qty: 5 | Refills: 0 | Status: DISCONTINUED | COMMUNITY
End: 2019-09-06

## 2019-09-06 RX ORDER — ATORVASTATIN CALCIUM 40 MG/1
40 TABLET, FILM COATED ORAL
Refills: 0 | Status: ACTIVE | COMMUNITY

## 2019-09-06 NOTE — REVIEW OF SYSTEMS
----- Message from Carlie JUSTICE Clarence sent at 8/17/2018  8:30 AM CDT -----  States she's calling to get a refill and has run out early and is out and needs to have it approved to refill early /pt can be reached at [phone]//thanks/dbw     1. What is the name of the medication you are requesting? combigan  2. What is the dose? .2/.5% 5mg  3. How do you take the medication? Orally, topically, etc? Eyedrops  4. How often do you take this medication? Twice daily  5. Do you need a 30 day or 90 day supply? 6 mnths supply  6. How many refills are you requesting?   7. What is your preferred pharmacy and location of the pharmacy?   8. Who can we contact with further questions? Pt    [facility] [id number] [address] & [address] [address]  Phone: [phone] Fax: [phone]         [Nausea] : nausea [Muscle Cramps] : muscle cramps [Pain/Numbness of Digits] : pain/numbness of digits [Recent Weight Gain (___ Lbs)] : no recent weight gain [Recent Weight Loss (___ Lbs)] : no recent weight loss [Chest Pain] : no chest pain [Shortness Of Breath] : no shortness of breath

## 2019-09-06 NOTE — HISTORY OF PRESENT ILLNESS
[FreeTextEntry1] : Patient is seen today for a routine follow up of type 2 DM, Hashimoto's Thyroiditis, hyperlipidemia.  \par Quality:  type 2 DM\par Severity:  moderate\par Duration of diabetes:  since 2009\par Associated Complications/ Symptoms:  nephropathy and neuropathy, CAD\par Modifying Factors:  Better with medication\par \par Patient tests blood glucose 3-4  times per day.   \par \par Current Diabetic Medication Regimen:\par  Metformin ER 1000 mg BID\par Glimepiride 4 mg  BID\par Victoza 1.2 mg daily (Stopped this due to cost 3 days ago, does not want to go back on)\par Tresiba 50 units qhs\par Came off Novolog due to hypoglycemia\par Intolerant to Invokana (fatigue), Trulicity (chest pain and arthralgias).\par Intolerant to ARB (angioedema) and statins.\par \par

## 2019-09-06 NOTE — ASSESSMENT
[FreeTextEntry1] : 59 year old male with type 2 DM with associated proteinuria and CAD, Hashimoto's Thyroiditis, HTN, hyperlipidemia, and vitamin D deficiency.  His glycemic control is improving.  \par \par 1.  Type 2 DM-   Due to cost, OK to hold on further use of victoza.  Will increase Tresiba to 60 units qhs due to slightly elevated fasting BG.  \par 2.  Hashimoto's Thyroiditis-  currently euthyroid. Continue to monitor TFTs.\par 3.  HTN-  as per cardiology.  Cannot tolerate ARB.\par 4.  Hyperlipidemia-   continue Atorvastatin.  \par 5.  Vitamin D deficiency-  Continue Vitamin D2 50,000 IU once a week.

## 2019-09-06 NOTE — DATA REVIEWED
[FreeTextEntry1] : LABS:\par 7/10/2019:\par Microalbumin/ Creatinine:  411\par LDL  45\par TSH  1.77\par A1c 6.7%\par \par 5/2/2019:\par Microalbumin/ Creatinine:  328\par LDL 50\par TSH  2.06\par A1c 7.5\par 25(OH)D  17

## 2019-09-06 NOTE — PHYSICAL EXAM
[No Acute Distress] : no acute distress [Normal Sclera/Conjunctiva] : normal sclera/conjunctiva [No Proptosis] : no proptosis [No Neck Mass] : no neck mass was observed [No LAD] : no lymphadenopathy [Thyroid Not Enlarged] : the thyroid was not enlarged [No Thyroid Nodules] : there were no palpable thyroid nodules [Normal Rate and Effort] : normal respiratory rhythm and effort [Clear to Auscultation] : lungs were clear to auscultation bilaterally [Normal Rate] : heart rate was normal  [Normal S1, S2] : normal S1 and S2 [Regular Rhythm] : with a regular rhythm [No Edema] : there was no peripheral edema [Normal Gait] : normal gait [Normal Insight/Judgement] : insight and judgment were intact [Normal Affect] : the affect was normal [Normal Mood] : the mood was normal [Acanthosis Nigricans] : no acanthosis nigricans [de-identified] : Obese male [de-identified] : 2/6 systolic murmur [de-identified] : trace edema b/l

## 2019-11-03 ENCOUNTER — RX RENEWAL (OUTPATIENT)
Age: 59
End: 2019-11-03

## 2019-11-08 ENCOUNTER — RX RENEWAL (OUTPATIENT)
Age: 59
End: 2019-11-08

## 2019-11-12 ENCOUNTER — APPOINTMENT (OUTPATIENT)
Dept: ENDOCRINOLOGY | Facility: CLINIC | Age: 59
End: 2019-11-12

## 2019-12-04 ENCOUNTER — RX RENEWAL (OUTPATIENT)
Age: 59
End: 2019-12-04

## 2019-12-10 ENCOUNTER — RX RENEWAL (OUTPATIENT)
Age: 59
End: 2019-12-10

## 2020-01-10 ENCOUNTER — RX RENEWAL (OUTPATIENT)
Age: 60
End: 2020-01-10

## 2020-01-17 ENCOUNTER — APPOINTMENT (OUTPATIENT)
Dept: ENDOCRINOLOGY | Facility: CLINIC | Age: 60
End: 2020-01-17
Payer: MEDICARE

## 2020-01-17 VITALS
BODY MASS INDEX: 39.17 KG/M2 | HEART RATE: 72 BPM | SYSTOLIC BLOOD PRESSURE: 130 MMHG | HEIGHT: 75 IN | WEIGHT: 315 LBS | DIASTOLIC BLOOD PRESSURE: 80 MMHG

## 2020-01-17 LAB
GLUCOSE BLDC GLUCOMTR-MCNC: 138
HBA1C MFR BLD HPLC: 8.2
LDLC SERPL DIRECT ASSAY-MCNC: 74
MICROALBUMIN/CREAT 24H UR-RTO: 1586

## 2020-01-17 PROCEDURE — 82962 GLUCOSE BLOOD TEST: CPT

## 2020-01-17 PROCEDURE — 99214 OFFICE O/P EST MOD 30 MIN: CPT | Mod: 25

## 2020-01-17 RX ORDER — LANCING DEVICE/LANCETS
KIT MISCELLANEOUS
Refills: 0 | Status: DISCONTINUED | COMMUNITY
End: 2020-01-17

## 2020-01-17 RX ORDER — LIRAGLUTIDE 6 MG/ML
18 INJECTION SUBCUTANEOUS DAILY
Qty: 4 | Refills: 1 | Status: DISCONTINUED | COMMUNITY
Start: 2019-08-19 | End: 2020-01-17

## 2020-01-17 NOTE — HISTORY OF PRESENT ILLNESS
[FreeTextEntry1] : Patient is seen today for a routine follow up of type 2 DM, Hashimoto's Thyroiditis, hyperlipidemia.  \par Quality:  type 2 DM\par Severity:  moderate\par Duration of diabetes:  since 2009\par Associated Complications/ Symptoms:  nephropathy and neuropathy, CAD\par Modifying Factors:  Better with medication\par \par Patient tests blood glucose 3-4  times per day.   Am fasting around 150 mg/dl.  \par \par Current Diabetic Medication Regimen:\par  Metformin ER 1000 mg BID\par Glimepiride 4 mg  BID\par Tresiba 80 units qhs\par Did well on Victoza, but stopped due to cost.  \par Will take Novolog only if hyperglycemic (will take 10 units if over 200 mg/dl).  \par Intolerant to Invokana (fatigue), Trulicity (chest pain and arthralgias).\par Intolerant to ARB (angioedema) and statins.\par \par

## 2020-01-17 NOTE — ASSESSMENT
[FreeTextEntry1] : 59 year old male with type 2 DM with associated proteinuria and CAD, Hashimoto's Thyroiditis, HTN, hyperlipidemia, and vitamin D deficiency.  His glycemic control and albuminuria has worsened.  \par \par 1.  Type 2 DM-   Try Ozempic with Coupon.  Start with 0.25 mg qweek for first month and then increase to 0.5 mg qweek.  \par 2.  Hashimoto's Thyroiditis-  currently euthyroid. Continue to monitor TFTs.\par 3.  HTN-  as per cardiology.  Cannot tolerate ARB.\par 4.  Hyperlipidemia-   continue Atorvastatin.  \par 5.  Vitamin D deficiency-  worsened, resume Vitamin D2 50,000 IU once a week.

## 2020-01-17 NOTE — PHYSICAL EXAM
[No Acute Distress] : no acute distress [Normal Sclera/Conjunctiva] : normal sclera/conjunctiva [No Neck Mass] : no neck mass was observed [No Proptosis] : no proptosis [No LAD] : no lymphadenopathy [No Thyroid Nodules] : there were no palpable thyroid nodules [Thyroid Not Enlarged] : the thyroid was not enlarged [Normal Rate] : heart rate was normal  [Normal Rate and Effort] : normal respiratory rhythm and effort [Clear to Auscultation] : lungs were clear to auscultation bilaterally [Regular Rhythm] : with a regular rhythm [Normal S1, S2] : normal S1 and S2 [Normal Insight/Judgement] : insight and judgment were intact [Normal Mood] : the mood was normal [Normal Affect] : the affect was normal [Acanthosis Nigricans] : no acanthosis nigricans [de-identified] : Obese male [de-identified] : 2/6 NILA

## 2020-01-17 NOTE — REVIEW OF SYSTEMS
[Recent Weight Gain (___ Lbs)] : recent [unfilled] ~Ulb weight gain [Shortness Of Breath] : shortness of breath [Chest Pain] : no chest pain [Nausea] : no nausea [Pain/Numbness of Digits] : no pain/numbness of digits

## 2020-01-22 RX ORDER — SEMAGLUTIDE 1.34 MG/ML
2 INJECTION, SOLUTION SUBCUTANEOUS
Qty: 1 | Refills: 5 | Status: DISCONTINUED | COMMUNITY
Start: 2020-01-17 | End: 2020-01-22

## 2020-06-12 ENCOUNTER — APPOINTMENT (OUTPATIENT)
Dept: ENDOCRINOLOGY | Facility: CLINIC | Age: 60
End: 2020-06-12

## 2020-07-13 ENCOUNTER — APPOINTMENT (OUTPATIENT)
Dept: ENDOCRINOLOGY | Facility: CLINIC | Age: 60
End: 2020-07-13
Payer: MEDICARE

## 2020-07-13 VITALS
HEIGHT: 75 IN | BODY MASS INDEX: 39.17 KG/M2 | HEART RATE: 75 BPM | SYSTOLIC BLOOD PRESSURE: 140 MMHG | DIASTOLIC BLOOD PRESSURE: 90 MMHG | WEIGHT: 315 LBS

## 2020-07-13 LAB — GLUCOSE BLDC GLUCOMTR-MCNC: 128

## 2020-07-13 PROCEDURE — 99214 OFFICE O/P EST MOD 30 MIN: CPT | Mod: 25

## 2020-07-13 PROCEDURE — 82962 GLUCOSE BLOOD TEST: CPT

## 2020-07-13 RX ORDER — FLUOCINOLONE ACETONIDE 0.11 MG/ML
0.01 OIL AURICULAR (OTIC)
Qty: 20 | Refills: 0 | Status: DISCONTINUED | COMMUNITY
Start: 2020-04-10

## 2020-07-13 RX ORDER — OXYCODONE 5 MG/1
5 TABLET ORAL
Qty: 14 | Refills: 0 | Status: DISCONTINUED | COMMUNITY
Start: 2020-06-29

## 2020-07-13 RX ORDER — METHYLPREDNISOLONE 4 MG/1
4 TABLET ORAL
Qty: 21 | Refills: 0 | Status: DISCONTINUED | COMMUNITY
Start: 2020-03-26

## 2020-07-13 RX ORDER — ELECTROLYTES/DEXTROSE
32G X 4 MM SOLUTION, ORAL ORAL
Qty: 3 | Refills: 1 | Status: DISCONTINUED | COMMUNITY
End: 2020-07-13

## 2020-07-13 RX ORDER — AMOXICILLIN AND CLAVULANATE POTASSIUM 875; 125 MG/1; MG/1
875-125 TABLET, COATED ORAL
Qty: 20 | Refills: 0 | Status: DISCONTINUED | COMMUNITY
Start: 2020-03-26

## 2020-07-13 RX ORDER — CLARITHROMYCIN 500 MG/1
500 TABLET, FILM COATED ORAL
Qty: 20 | Refills: 0 | Status: DISCONTINUED | COMMUNITY
Start: 2020-03-02

## 2020-07-13 NOTE — REVIEW OF SYSTEMS
[Fatigue] : fatigue [Recent Weight Loss (___ Lbs)] : recent weight loss: [unfilled] lbs [Easy Bruising] : a tendency for easy bruising [Decreased Appetite] : appetite not decreased [Visual Field Defect] : no visual field defect [Blurred Vision] : no blurred vision [Dysphagia] : no dysphagia [Neck Pain] : no neck pain [Dysphonia] : no dysphonia [Chest Pain] : no chest pain [Palpitations] : no palpitations [Polyuria] : no polyuria [Constipation] : no constipation [Diarrhea] : no diarrhea [Headaches] : no headaches [Dysuria] : no dysuria [Depression] : no depression [Tremors] : no tremors [Polydipsia] : no polydipsia [Anxiety] : no anxiety [Heat Intolerance] : no heat intolerance [Cold Intolerance] : no cold intolerance [Swelling] : no swelling [de-identified] : takes aspirin

## 2020-07-13 NOTE — ASSESSMENT
[FreeTextEntry1] : 60 year old male with type 2 DM with associated proteinuria and CAD, Hashimoto's Thyroiditis, HTN, hyperlipidemia, and vitamin D deficiency.  His glycemic control is unknown due to no meter and no recent labs. \par \par Plan:\par 1.) Type 2 DM-  check A1C now\par - continue current rx \par - educated on healthy food choices \par \par 2.)  Hashimoto's Thyroiditis-  check TFTs now \par \par 3.) HTN-  as per cardiology.  Cannot tolerate ARB\par \par 4.)  Hyperlipidemia-  check lipids now. continue Atorvastatin.  \par \par 5.)  Vitamin D deficiency-  check levels now, continue Vitamin D2 50,000 IU once a week.  \par \par Continue to follow up with hematology/oncology r/t swollen lymph nodes. He is currently being r/o for lymphoma

## 2020-07-13 NOTE — PHYSICAL EXAM
[Alert] : alert [Well Nourished] : well nourished [No Acute Distress] : no acute distress [Well Developed] : well developed [EOMI] : extra ocular movement intact [Normal Sclera/Conjunctiva] : normal sclera/conjunctiva [No Thyroid Nodules] : no palpable thyroid nodules [Thyroid Not Enlarged] : the thyroid was not enlarged [Normal Rate and Effort] : normal respiratory rate and effort [Clear to Auscultation] : lungs were clear to auscultation bilaterally [No Respiratory Distress] : no respiratory distress [Normal S1, S2] : normal S1 and S2 [Normal Rate] : heart rate was normal [Regular Rhythm] : with a regular rhythm [Normal Bowel Sounds] : normal bowel sounds [Not Tender] : non-tender [Soft] : abdomen soft [No Rash] : no rash [Oriented x3] : oriented to person, place, and time [No Tremors] : no tremors [Normal Mood] : the mood was normal [Normal Insight/Judgement] : insight and judgment were intact [Acanthosis Nigricans] : no acanthosis nigricans [de-identified] : pt. declined foot examination  [de-identified] : swollen lymph nodes L>R

## 2020-07-13 NOTE — HISTORY OF PRESENT ILLNESS
[FreeTextEntry1] : Quality: Type 2 DM\par Severity:  moderate\par Duration of diabetes:  since 2009\par Associated Complications/ Symptoms:  nephropathy and neuropathy, CAD\par Modifying Factors:  Better with medication\par \par Patient tests blood glucose 3-4  times per day.   Am fasting around 120s mg/dl.  \par Current \par \par Current Diabetic Medication Regimen:\par Metformin ER 1000 mg BID\par Glimepiride 4 mg  BID\par Tresiba 80 units qhs\par Did well on Victoza, but stopped due to cost.  \par Will take Novolog only if hyperglycemic (will take 10 units if over 200 mg/dl).  \par Intolerant to Invokana (fatigue), Trulicity (chest pain and arthralgias).\par - stopped Ozempic due to cost \par Intolerant to ARB (angioedema) and statins.\par \par Exercise: none due to arthritis \par Diet: a lot of yogurt, eggs, banana, chicken tacos, protein pasta, hamburgers \par \par Date of last eye exam: 2019 (-) \par Date of last foot exam: 1/2020 with podiatry Dr. Marroquin\par Flu vaccine: 2019

## 2020-07-14 LAB
25(OH)D3 SERPL-MCNC: 20.8 NG/ML
ALBUMIN SERPL ELPH-MCNC: 5.1 G/DL
ALP BLD-CCNC: 68 U/L
ALT SERPL-CCNC: 46 U/L
ANION GAP SERPL CALC-SCNC: 19 MMOL/L
AST SERPL-CCNC: 41 U/L
BASOPHILS # BLD AUTO: 0.15 K/UL
BASOPHILS NFR BLD AUTO: 1.3 %
BILIRUB SERPL-MCNC: 0.5 MG/DL
BUN SERPL-MCNC: 18 MG/DL
CALCIUM SERPL-MCNC: 9.9 MG/DL
CHLORIDE SERPL-SCNC: 99 MMOL/L
CHOLEST SERPL-MCNC: 135 MG/DL
CHOLEST/HDLC SERPL: 3 RATIO
CO2 SERPL-SCNC: 20 MMOL/L
CREAT SERPL-MCNC: 0.82 MG/DL
CREAT SPEC-SCNC: 160 MG/DL
EOSINOPHIL # BLD AUTO: 0.58 K/UL
EOSINOPHIL NFR BLD AUTO: 5.1 %
ESTIMATED AVERAGE GLUCOSE: 171 MG/DL
GLUCOSE SERPL-MCNC: 127 MG/DL
HBA1C MFR BLD HPLC: 7.6 %
HCT VFR BLD CALC: 51.3 %
HDLC SERPL-MCNC: 44 MG/DL
HGB BLD-MCNC: 16.6 G/DL
IMM GRANULOCYTES NFR BLD AUTO: 0.4 %
LDLC SERPL CALC-MCNC: 57 MG/DL
LYMPHOCYTES # BLD AUTO: 1.96 K/UL
LYMPHOCYTES NFR BLD AUTO: 17.2 %
MAN DIFF?: NORMAL
MCHC RBC-ENTMCNC: 32.2 PG
MCHC RBC-ENTMCNC: 32.4 GM/DL
MCV RBC AUTO: 99.6 FL
MICROALBUMIN 24H UR DL<=1MG/L-MCNC: 85.4 MG/DL
MICROALBUMIN/CREAT 24H UR-RTO: 533 MG/G
MONOCYTES # BLD AUTO: 1.15 K/UL
MONOCYTES NFR BLD AUTO: 10.1 %
NEUTROPHILS # BLD AUTO: 7.48 K/UL
NEUTROPHILS NFR BLD AUTO: 65.9 %
PLATELET # BLD AUTO: 282 K/UL
POTASSIUM SERPL-SCNC: 4.5 MMOL/L
PROT SERPL-MCNC: 8.3 G/DL
RBC # BLD: 5.15 M/UL
RBC # FLD: 13.2 %
SODIUM SERPL-SCNC: 138 MMOL/L
T4 FREE SERPL-MCNC: 1.2 NG/DL
TRIGL SERPL-MCNC: 167 MG/DL
TSH SERPL-ACNC: 1.6 UIU/ML
VIT B12 SERPL-MCNC: 705 PG/ML
WBC # FLD AUTO: 11.37 K/UL

## 2020-07-30 ENCOUNTER — APPOINTMENT (OUTPATIENT)
Dept: NEPHROLOGY | Facility: CLINIC | Age: 60
End: 2020-07-30
Payer: MEDICARE

## 2020-07-30 VITALS
RESPIRATION RATE: 16 BRPM | HEIGHT: 75 IN | WEIGHT: 315 LBS | SYSTOLIC BLOOD PRESSURE: 150 MMHG | BODY MASS INDEX: 39.17 KG/M2 | TEMPERATURE: 98.7 F | OXYGEN SATURATION: 97 % | HEART RATE: 84 BPM | DIASTOLIC BLOOD PRESSURE: 89 MMHG

## 2020-07-30 DIAGNOSIS — E66.9 OBESITY, UNSPECIFIED: ICD-10-CM

## 2020-07-30 DIAGNOSIS — R80.9 PROTEINURIA, UNSPECIFIED: ICD-10-CM

## 2020-07-30 DIAGNOSIS — Z86.79 PERSONAL HISTORY OF OTHER DISEASES OF THE CIRCULATORY SYSTEM: ICD-10-CM

## 2020-07-30 PROCEDURE — 99214 OFFICE O/P EST MOD 30 MIN: CPT

## 2020-07-30 PROCEDURE — 99204 OFFICE O/P NEW MOD 45 MIN: CPT

## 2020-07-30 NOTE — PHYSICAL EXAM
[General Appearance - In No Acute Distress] : in no acute distress [General Appearance - Alert] : alert [Neck Appearance] : the appearance of the neck was normal [Outer Ear] : the ears and nose were normal in appearance [Sclera] : the sclera and conjunctiva were normal [Neck Cervical Mass (___cm)] : no neck mass was observed [] : no respiratory distress [Respiration, Rhythm And Depth] : normal respiratory rhythm and effort [Exaggerated Use Of Accessory Muscles For Inspiration] : no accessory muscle use [Auscultation Breath Sounds / Voice Sounds] : lungs were clear to auscultation bilaterally [Apical Impulse] : the apical impulse was normal [Heart Sounds Gallop] : no gallops [Heart Sounds] : normal S1 and S2 [Heart Rate And Rhythm] : heart rate was normal and rhythm regular [Murmurs] : no murmurs [Heart Sounds Pericardial Friction Rub] : no pericardial rub [Bowel Sounds] : normal bowel sounds [Abdomen Soft] : soft [Abdomen Mass (___ Cm)] : no abdominal mass palpated [Abdomen Tenderness] : non-tender [Involuntary Movements] : no involuntary movements were seen [Skin Color & Pigmentation] : normal skin color and pigmentation [No CVA Tenderness] : no ~M costovertebral angle tenderness [Impaired Insight] : insight and judgment were intact [Affect] : the affect was normal [Mood] : the mood was normal [___ +] : bilateral [unfilled]+ pitting edema to the ankles

## 2020-08-02 LAB
APPEARANCE: CLEAR
BACTERIA: NEGATIVE
BILIRUBIN URINE: NEGATIVE
BLOOD URINE: NEGATIVE
COLOR: YELLOW
CREAT SPEC-SCNC: 85 MG/DL
CREAT/PROT UR: 2.1 RATIO
GLUCOSE QUALITATIVE U: NEGATIVE
HYALINE CASTS: 0 /LPF
KETONES URINE: NEGATIVE
LEUKOCYTE ESTERASE URINE: NEGATIVE
MICROSCOPIC-UA: NORMAL
NITRITE URINE: NEGATIVE
PH URINE: 6.5
PROT UR-MCNC: 179 MG/DL
PROTEIN URINE: ABNORMAL
RED BLOOD CELLS URINE: 2 /HPF
SPECIFIC GRAVITY URINE: 1.02
SQUAMOUS EPITHELIAL CELLS: 1 /HPF
UROBILINOGEN URINE: NORMAL
WHITE BLOOD CELLS URINE: 0 /HPF

## 2020-08-03 NOTE — HISTORY OF PRESENT ILLNESS
[FreeTextEntry1] : Initial Consult\par \par Patient's wife accompanied him during visit today\par \par Referred By: Endocrinology\par Referred for: Microalbuminuria\par \par PCP: Dr. David Sheppard\par Endocrinology: Dr. Reyes and group\par Cardiologist: Dr. Benigno Holguin\par Hematology: Dr. Mcclendon\par \par *Allergies: ARBs (?anaphylactic rxn with Cozaar/Diovan)\par \par Social Hx: Former smoker (stopped 15 years ago). Denies alcohol or drug use\par \par Family Hx: \par DM: Mother, father, maternal grandmother\par HTN: Mother, father, sister, brother\par \par PMH: DM 2- long term insulin use (5 years), HTN (>30 years), HLD, arthritis, Obesity, Vitamin D deficiency\par [place]1.5 years ago[place] [facility] [place] \par Patient feels well today, no complaints. Blood pressure slightly elevated today and patient does notice some swelling in his legs. He follows with his cardiologist regularly (every 3-4 months). Patient states he has a home blood pressure monitor but does not check blood pressure readings regularly at home. He occasionally experiences chest tightness and dyspnea on exertion and is unable to exercise regularly due to severe arthritis pains. He denies chest pain, dyspnea at rest, dizziness, headache. \par

## 2020-08-03 NOTE — ASSESSMENT
[FreeTextEntry1] : PLAN:\par Recent labs from July 2020 reviewed: SCr 0.82,  eGFR 95 – normal renal function\par +Microalbuminuria. Will get UA, prot/creat ratio today\par HTN: Hydralazine 50 mg TID, Metoprolol 25mg TID, Nifedipine 30mg, Lasix 20mg.\par Will discuss alternating Lasix 20mg/40mg with patient's cardiologist for better blood pressure control and for +LE edema\par DM: A1C 7.6%, following with Endo. \par HLD: on Atorvastatin 40mg\par Vitamin D insufficiency: on weekly supplement\par F.U in 6 months\par Call with any additional questions/concerns\par \par ADDENDUM:\par -Left message with Dr. Benigno Holguin's office (Cardiology) to discuss lasix dosing\par -Will avoid prescribing ACE/ARB medications for proteinuria (protein/creat. ratio 2.1) due to patient's hx. of anaphylaxis from ARB medications. Will discuss starting patient on an SGLT-2 inhibitor with Endocrinology. Discussed plan with Dr. Jones

## 2020-08-03 NOTE — REVIEW OF SYSTEMS
[Negative] : Endocrine [Lower Ext Edema] : lower extremity edema [Fever] : no fever [Chills] : no chills [Feeling Poorly] : not feeling poorly [de-identified] :  tendency for easy bruising, but no swelling . takes aspirin.  \par  \par tendency for easy bruising, but no swelling . takes aspirin. [FreeTextEntry2] : fatigue and recent weight loss: 10 lbs, but appetite not decreased

## 2020-08-13 ENCOUNTER — TRANSCRIPTION ENCOUNTER (OUTPATIENT)
Age: 60
End: 2020-08-13

## 2020-09-07 ENCOUNTER — RX RENEWAL (OUTPATIENT)
Age: 60
End: 2020-09-07

## 2020-11-01 ENCOUNTER — RX RENEWAL (OUTPATIENT)
Age: 60
End: 2020-11-01

## 2021-01-19 ENCOUNTER — APPOINTMENT (OUTPATIENT)
Dept: ENDOCRINOLOGY | Facility: CLINIC | Age: 61
End: 2021-01-19
Payer: MEDICARE

## 2021-01-19 PROCEDURE — 99443: CPT | Mod: 95

## 2021-01-19 RX ORDER — INSULIN ASPART 100 [IU]/ML
100 INJECTION, SOLUTION INTRAVENOUS; SUBCUTANEOUS
Qty: 1 | Refills: 0 | Status: DISCONTINUED | COMMUNITY
Start: 2019-02-12 | End: 2021-01-19

## 2021-01-19 RX ORDER — METOPROLOL SUCCINATE 50 MG/1
50 TABLET, EXTENDED RELEASE ORAL
Qty: 180 | Refills: 0 | Status: ACTIVE | COMMUNITY
Start: 2020-11-23

## 2021-01-19 RX ORDER — METOPROLOL TARTRATE 25 MG/1
25 TABLET, FILM COATED ORAL DAILY
Refills: 0 | Status: DISCONTINUED | COMMUNITY
End: 2021-01-19

## 2021-01-19 NOTE — HISTORY OF PRESENT ILLNESS
[Home] : at home, [unfilled] , at the time of the visit. [Other Location: e.g. Home (Enter Location, City,State)___] : at [unfilled] [Verbal consent obtained from patient] : the patient, [unfilled] [FreeTextEntry1] : Quality: Type 2 DM\par Severity:  moderate\par Duration of diabetes:  since 2009\par Associated Complications/ Symptoms:  nephropathy and neuropathy, CAD\par Modifying Factors:  Better with medication\par \par Patient tests blood glucose 3-4  times per day.   Am fasting around 130s-140s\par \par Current Diabetic Medication Regimen:\par Jardiance 10 mg daily - increased urination, tolerable \par Metformin ER 1000 mg BID\par Glimepiride 4 mg  BID\par Tresiba 80 units qhs\par Did well on Victoza, but stopped due to cost.  \par Will take Novolog only if hyperglycemic (will take 10 units if over 200 mg/dl).  \par Intolerant to Invokana (fatigue), Trulicity (chest pain and arthralgias).\par - stopped Ozempic due to cost \par Intolerant to ARB (angioedema) and statins.\par \par Exercise: none due to arthritis \par Diet: a lot of yogurt, eggs, banana, chicken tacos, protein pasta, hamburgers \par \par Date of last eye exam: 2019 (-) \par Date of last foot exam: 1/2020 with podiatry Dr. Marroquin\par Flu vaccine: 2020

## 2021-01-19 NOTE — ASSESSMENT
[FreeTextEntry1] : 60 year old male with Type 2 DM with associated proteinuria and CAD, Hashimoto's Thyroiditis, HTN, hyperlipidemia, and vitamin D deficiency.  His glycemic control is unknown due to no meter and no recent labs. \par \par Plan:\par 1.) Type 2 DM-  check A1C now\par - continue current rx \par -split Tresiba dose into 2 separate injection sites\par - send in logs in 1 week\par - educated on healthy food choices \par \par 2.) Hashimoto's Thyroiditis-  check TFTs now \par \par 3.) HTN-  as per cardiology.  Cannot tolerate ARB\par \par 4.)  Hyperlipidemia-  check lipids now. continue Atorvastatin.  \par \par 5.)  Vitamin D deficiency-  check levels now, continue Vitamin D2 50,000 IU once a week.  \par \par follow up with hematology/oncology r/t swollen lymph nodes. He is currently being r/o for lymphoma \par \par Pt. verbalized understanding of plan.\par \par Start Time: 2:35\par End TIme: 3:00

## 2021-01-19 NOTE — REVIEW OF SYSTEMS
[Recent Weight Gain (___ Lbs)] : recent weight gain: [unfilled] lbs [Joint Pain] : joint pain [Fatigue] : no fatigue [Decreased Appetite] : appetite not decreased [Visual Field Defect] : no visual field defect [Blurred Vision] : no blurred vision [Dysphagia] : no dysphagia [Neck Pain] : no neck pain [Dysphonia] : no dysphonia [Chest Pain] : no chest pain [Palpitations] : no palpitations [Constipation] : no constipation [Diarrhea] : no diarrhea [Polyuria] : no polyuria [Dysuria] : no dysuria [Headaches] : no headaches [Tremors] : no tremors [Polydipsia] : no polydipsia [FreeTextEntry9] : chronic back pain

## 2021-02-04 ENCOUNTER — APPOINTMENT (OUTPATIENT)
Dept: NEPHROLOGY | Facility: CLINIC | Age: 61
End: 2021-02-04

## 2021-03-20 ENCOUNTER — RX RENEWAL (OUTPATIENT)
Age: 61
End: 2021-03-20

## 2021-04-19 ENCOUNTER — APPOINTMENT (OUTPATIENT)
Dept: ENDOCRINOLOGY | Facility: CLINIC | Age: 61
End: 2021-04-19

## 2021-06-14 ENCOUNTER — APPOINTMENT (OUTPATIENT)
Dept: ENDOCRINOLOGY | Facility: CLINIC | Age: 61
End: 2021-06-14
Payer: MEDICARE

## 2021-06-14 VITALS
HEART RATE: 76 BPM | SYSTOLIC BLOOD PRESSURE: 124 MMHG | WEIGHT: 315 LBS | BODY MASS INDEX: 39.17 KG/M2 | HEIGHT: 75 IN | DIASTOLIC BLOOD PRESSURE: 82 MMHG

## 2021-06-14 DIAGNOSIS — R06.2 WHEEZING: ICD-10-CM

## 2021-06-14 LAB
GLUCOSE BLDC GLUCOMTR-MCNC: 130
HBA1C MFR BLD HPLC: 7.3

## 2021-06-14 PROCEDURE — 99215 OFFICE O/P EST HI 40 MIN: CPT | Mod: 25

## 2021-06-14 PROCEDURE — 83036 HEMOGLOBIN GLYCOSYLATED A1C: CPT | Mod: QW

## 2021-06-14 PROCEDURE — 82962 GLUCOSE BLOOD TEST: CPT

## 2021-06-14 NOTE — ASSESSMENT
[FreeTextEntry1] : 60 year old male with Type 2 DM with associated proteinuria and CAD, Hashimoto's Thyroiditis, HTN, hyperlipidemia, and vitamin D deficiency.  \par \par Plan:\par 1.) Type 2 DM-  controlled \par - continue current rx \par -split Tresiba dose into 2 separate injection sites\par - send in logs in 1 week\par - educated on healthy food choices \par - repeat A1C before next visit \par \par 2.) Hashimoto's Thyroiditis- euthyroid \par \par 3.) HTN-  as per cardiology.  Cannot tolerate ARB\par \par 4.)  Hyperlipidemia-  check lipids now. continue Atorvastatin.  \par \par 5.)  Vitamin D deficiency-  low, continue Vitamin D2 50,000 IU once a week.  \par \par Continue to follow up with hematology/oncology. He is currently being r/o for lymphoma\par \par Follow up with PMD due to wheeze in right lung and cough for weeks. \par \par RTO in 3 months.

## 2021-06-14 NOTE — HISTORY OF PRESENT ILLNESS
[FreeTextEntry1] : Quality: Type 2 DM\par Severity:  moderate\par Duration of diabetes:  since 2009\par Associated Complications/ Symptoms:  nephropathy and neuropathy, CAD\par Modifying Factors:  Better with medication\par \par Patient tests blood glucose 3-4  times per day.   Am fasting around 120s\par Current \par \par Current Diabetic Medication Regimen:\par Jardiance 10 mg daily - increased urination, tolerable \par Metformin  mg 2 tabs BID\par Glimepiride 4 mg  BID\par Tresiba 80 units qhs\par Did well on Victoza, but stopped due to cost.  \par Will take Novolog only if hyperglycemic (will take 10 units if over 200 mg/dl).  \par Intolerant to Invokana (fatigue), Trulicity (chest pain and arthralgias).\par - stopped Ozempic due to cost \par Intolerant to ARB (angioedema) and statins.\par \par Exercise: none due to arthritis \par Diet: a lot of yogurt, eggs, banana, chicken tacos, protein pasta, hamburgers \par \par Date of last eye exam: 2020 (-) \par Date of last foot exam: last week with podiatry Dr. Marroquin, blood blister left foot 2nd toe, treated for infection with antibiotics, wound almost completed heal \par Flu vaccine: 2020\par \par Sees Dr. Cook for Nephrology - had cyst on kidney

## 2021-06-14 NOTE — REVIEW OF SYSTEMS
[Fatigue] : fatigue [Recent Weight Gain (___ Lbs)] : recent weight gain: [unfilled] lbs [Visual Field Defect] : visual field defect [Palpitations] : palpitations [Decreased Appetite] : appetite not decreased [Blurred Vision] : no blurred vision [Dysphagia] : no dysphagia [Neck Pain] : no neck pain [Dysphonia] : no dysphonia [Chest Pain] : no chest pain [Constipation] : no constipation [Diarrhea] : no diarrhea [Polyuria] : no polyuria [Dysuria] : no dysuria [Headaches] : no headaches [Tremors] : no tremors [Depression] : no depression [Anxiety] : no anxiety [Polydipsia] : no polydipsia [Swelling] : no swelling [FreeTextEntry3] : far sided  [FreeTextEntry5] : PVCs sees Cardiology

## 2021-06-14 NOTE — PHYSICAL EXAM
[Alert] : alert [Well Nourished] : well nourished [Obese] : obese [No Acute Distress] : no acute distress [Well Developed] : well developed [Normal Sclera/Conjunctiva] : normal sclera/conjunctiva [EOMI] : extra ocular movement intact [No LAD] : no lymphadenopathy [Thyroid Not Enlarged] : the thyroid was not enlarged [No Thyroid Nodules] : no palpable thyroid nodules [No Respiratory Distress] : no respiratory distress [No Accessory Muscle Use] : no accessory muscle use [Normal Rate and Effort] : normal respiratory rate and effort [Normal S1, S2] : normal S1 and S2 [Normal Rate] : heart rate was normal [Regular Rhythm] : with a regular rhythm [Normal Bowel Sounds] : normal bowel sounds [Not Tender] : non-tender [Soft] : abdomen soft [Normal Gait] : normal gait [No Rash] : no rash [Acanthosis Nigricans] : no acanthosis nigricans [No Tremors] : no tremors [Oriented x3] : oriented to person, place, and time [Normal Affect] : the affect was normal [Normal Mood] : the mood was normal [Normal Insight/Judgement] : insight and judgment were intact [de-identified] : Right upper lobe wheeze  [de-identified] : 2+ edema in BLE

## 2021-09-16 ENCOUNTER — APPOINTMENT (OUTPATIENT)
Dept: ENDOCRINOLOGY | Facility: CLINIC | Age: 61
End: 2021-09-16

## 2021-10-01 ENCOUNTER — NON-APPOINTMENT (OUTPATIENT)
Age: 61
End: 2021-10-01

## 2021-10-01 ENCOUNTER — APPOINTMENT (OUTPATIENT)
Dept: ENDOCRINOLOGY | Facility: CLINIC | Age: 61
End: 2021-10-01
Payer: MEDICARE

## 2021-10-01 VITALS
WEIGHT: 315 LBS | HEIGHT: 75 IN | BODY MASS INDEX: 39.17 KG/M2 | SYSTOLIC BLOOD PRESSURE: 155 MMHG | TEMPERATURE: 98 F | HEART RATE: 87 BPM | RESPIRATION RATE: 15 BRPM | DIASTOLIC BLOOD PRESSURE: 85 MMHG | OXYGEN SATURATION: 98 %

## 2021-10-01 DIAGNOSIS — Z87.39 PERSONAL HISTORY OF OTHER DISEASES OF THE MUSCULOSKELETAL SYSTEM AND CONNECTIVE TISSUE: ICD-10-CM

## 2021-10-01 LAB — GLUCOSE SERPL-MCNC: 236

## 2021-10-01 PROCEDURE — 99214 OFFICE O/P EST MOD 30 MIN: CPT

## 2021-10-01 RX ORDER — ASPIRIN 81 MG
81 TABLET, DELAYED RELEASE (ENTERIC COATED) ORAL
Refills: 0 | Status: DISCONTINUED | COMMUNITY
End: 2021-10-01

## 2021-10-01 RX ORDER — CLOPIDOGREL BISULFATE 75 MG/1
75 TABLET, FILM COATED ORAL
Refills: 0 | Status: ACTIVE | COMMUNITY

## 2021-10-01 RX ORDER — TICAGRELOR 90 MG/1
90 TABLET ORAL TWICE DAILY
Refills: 0 | Status: DISCONTINUED | COMMUNITY
End: 2021-10-01

## 2021-10-01 NOTE — REVIEW OF SYSTEMS
[Recent Weight Gain (___ Lbs)] : recent weight gain: [unfilled] lbs [Joint Pain] : joint pain [Back Pain] : back pain [Chest Pain] : no chest pain [Shortness Of Breath] : no shortness of breath

## 2021-10-01 NOTE — PHYSICAL EXAM
[Obese] : obese [No Acute Distress] : no acute distress [Normal Sclera/Conjunctiva] : normal sclera/conjunctiva [No Lid Lag] : no lid lag [No Neck Mass] : no neck mass was observed [No LAD] : no lymphadenopathy [Supple] : the neck was supple [Thyroid Not Enlarged] : the thyroid was not enlarged [No Thyroid Nodules] : no palpable thyroid nodules [No Respiratory Distress] : no respiratory distress [Clear to Auscultation] : lungs were clear to auscultation bilaterally [Normal S1, S2] : normal S1 and S2 [Normal Rate] : heart rate was normal [Regular Rhythm] : with a regular rhythm [Normal Affect] : the affect was normal [Normal Insight/Judgement] : insight and judgment were intact [Normal Mood] : the mood was normal [Acanthosis Nigricans] : no acanthosis nigricans [de-identified] : 2/6 systolic murmur

## 2021-10-01 NOTE — HISTORY OF PRESENT ILLNESS
[FreeTextEntry1] : Follow up of type 2 DM, Hashimoto's Thyroiditis, hyperlipidemia.  \par \par Quality:  type 2 DM\par Severity:  moderate\par Duration of diabetes:  since 2009\par Associated Complications/ Symptoms:  nephropathy and neuropathy, CAD\par Modifying Factors:  Better with medication\par \par SMBG:  has been testing only intermittently.  Most BG in the 100- 150 mg/dl range.  Hypoglycemia is rare.  \par \par Current Diabetic Medication Regimen:\par Metformin ER 1000 mg BID\par Glimepiride 4 mg  BID\par Jardiance 10 mg daily\par Tresiba 80 units qhs\par Used Novolog in the past, now off.   \par Victoza and Ozempic were too costly. \par Intolerant to Invokana (fatigue), Trulicity (chest pain and arthralgias).\par Intolerant to ARB (angioedema) and statins.\par \par Had epidural injection yesterday.  Developed hyperglycemia after injection.\par Wife was just diagnosed with frontotemporal dementia.   \par \par

## 2021-10-01 NOTE — ASSESSMENT
[FreeTextEntry1] : 60  year old male with type 2 DM with associated proteinuria and CAD, Hashimoto's Thyroiditis, HTN, hyperlipidemia, and vitamin D deficiency.   His glycemic control seems to be improving, but need labs to confirm.  He does have acute hyperglycemia today related to epidural injection.  \par \par 1.  Type 2 DM-    Continue Tresiba, Metformin, Glimepiride and Jardiance.  If hyperglycemia continues, increase Tresiba by 10 units to compensate for steroid effect.    \par 2.  Hashimoto's Thyroiditis-  Continue to monitor TFTs.\par 3.  HTN-   Continue current Rx.  Intolerant to ARB.\par 4.  Hyperlipidemia-   continue Atorvastatin.  \par 5.  Vitamin D deficiency-  Continue Vitamin D2 50,000 IU once a week.  \par \par Patient follows closely with podiatry (Dr. Marroquin)

## 2022-02-03 ENCOUNTER — APPOINTMENT (OUTPATIENT)
Dept: ENDOCRINOLOGY | Facility: CLINIC | Age: 62
End: 2022-02-03
Payer: MEDICARE

## 2022-02-03 VITALS
TEMPERATURE: 97.8 F | HEIGHT: 75 IN | HEART RATE: 74 BPM | WEIGHT: 315 LBS | OXYGEN SATURATION: 96 % | SYSTOLIC BLOOD PRESSURE: 140 MMHG | BODY MASS INDEX: 39.17 KG/M2 | RESPIRATION RATE: 16 BRPM | DIASTOLIC BLOOD PRESSURE: 80 MMHG

## 2022-02-03 LAB — GLUCOSE BLDC GLUCOMTR-MCNC: 120

## 2022-02-03 PROCEDURE — 82962 GLUCOSE BLOOD TEST: CPT

## 2022-02-03 PROCEDURE — 99214 OFFICE O/P EST MOD 30 MIN: CPT | Mod: 25

## 2022-02-03 NOTE — DATA REVIEWED
[FreeTextEntry1] : LABS:\par 1/27/2021:\par UACR  928\par Trig 235\par LDL 63\par Creatinine 0.78\par A1c 6.9%\par TSH 2.34\par Free T4  1.1\par 25(OH)D  23

## 2022-02-03 NOTE — HISTORY OF PRESENT ILLNESS
[FreeTextEntry1] : Follow up of type 2 DM, Hashimoto's Thyroiditis, hyperlipidemia.  \par \par Quality:  type 2 DM\par Severity:  moderate\par Duration of diabetes:  since 2009\par Associated Complications/ Symptoms:  nephropathy and neuropathy, CAD\par Modifying Factors:  Better with medication\par \par SMBG:  testing once a day:  Morning fasting around 140 mg/dl. \par \par Current Diabetic Medication Regimen:\par Metformin ER 1000 mg BID\par Glimepiride 4 mg  BID\par Jardiance 10 mg daily\par Tresiba 60 units qhs\par Used Novolog in the past, now off.   \par Victoza and Ozempic were too costly. \par Intolerant to Invokana (fatigue), Trulicity (chest pain and arthralgias).\par Intolerant to ARB (angioedema) and statins.\par \par  Looking to have back surgery (fusion).  Just had stress test which was abnormal and now will be going for cardiac cath.  \par \par

## 2022-02-03 NOTE — ASSESSMENT
[FreeTextEntry1] : 61  year old male with type 2 DM with associated proteinuria and CAD, Hashimoto's Thyroiditis, HTN, hyperlipidemia, and vitamin D deficiency.   His glycemic control is improving.  \par \par 1.  Type 2 DM-    Continue Tresiba, Metformin, Glimepiride and Jardiance.   Repeat A1c in 4 months.    \par 2.  Hashimoto's Thyroiditis-  Currently euthyroid.  Continue to monitor TFTs.\par 3.  HTN-   Continue current Rx.  Intolerant to ARB.\par 4.  Hyperlipidemia-   continue Atorvastatin.  \par 5.  Vitamin D deficiency-  Continue Vitamin D2 50,000 IU once a week.  \par \par Patient follows closely with podiatry (Dr. Marroquin)

## 2022-02-03 NOTE — PHYSICAL EXAM
[Obese] : obese [No Acute Distress] : no acute distress [Normal Sclera/Conjunctiva] : normal sclera/conjunctiva [No Lid Lag] : no lid lag [No Neck Mass] : no neck mass was observed [No LAD] : no lymphadenopathy [Supple] : the neck was supple [Thyroid Not Enlarged] : the thyroid was not enlarged [No Thyroid Nodules] : no palpable thyroid nodules [No Respiratory Distress] : no respiratory distress [Clear to Auscultation] : lungs were clear to auscultation bilaterally [Normal S1, S2] : normal S1 and S2 [Normal Rate] : heart rate was normal [Regular Rhythm] : with a regular rhythm [No Edema] : no peripheral edema [Acanthosis Nigricans] : no acanthosis nigricans [Normal Affect] : the affect was normal [Normal Insight/Judgement] : insight and judgment were intact [Normal Mood] : the mood was normal [de-identified] : 2/6 systolic murmur

## 2022-02-16 ENCOUNTER — NON-APPOINTMENT (OUTPATIENT)
Age: 62
End: 2022-02-16

## 2022-02-16 RX ORDER — EMPAGLIFLOZIN 10 MG/1
10 TABLET, FILM COATED ORAL
Qty: 90 | Refills: 1 | Status: DISCONTINUED | COMMUNITY
Start: 2020-08-03 | End: 2022-02-16

## 2022-04-24 ENCOUNTER — RX RENEWAL (OUTPATIENT)
Age: 62
End: 2022-04-24

## 2022-05-16 ENCOUNTER — RX RENEWAL (OUTPATIENT)
Age: 62
End: 2022-05-16

## 2022-06-23 ENCOUNTER — APPOINTMENT (OUTPATIENT)
Dept: ENDOCRINOLOGY | Facility: CLINIC | Age: 62
End: 2022-06-23
Payer: MEDICARE

## 2022-06-23 VITALS
BODY MASS INDEX: 39.17 KG/M2 | RESPIRATION RATE: 16 BRPM | OXYGEN SATURATION: 97 % | HEIGHT: 75 IN | SYSTOLIC BLOOD PRESSURE: 130 MMHG | DIASTOLIC BLOOD PRESSURE: 60 MMHG | TEMPERATURE: 98.1 F | WEIGHT: 315 LBS | HEART RATE: 86 BPM

## 2022-06-23 DIAGNOSIS — D75.1 SECONDARY POLYCYTHEMIA: ICD-10-CM

## 2022-06-23 LAB — GLUCOSE BLDC GLUCOMTR-MCNC: 178

## 2022-06-23 PROCEDURE — 82962 GLUCOSE BLOOD TEST: CPT

## 2022-06-23 PROCEDURE — 99214 OFFICE O/P EST MOD 30 MIN: CPT | Mod: 25

## 2022-06-23 NOTE — REVIEW OF SYSTEMS
[Recent Weight Gain (___ Lbs)] : recent weight gain: [unfilled] lbs [Shortness Of Breath] : shortness of breath [Chest Pain] : no chest pain

## 2022-06-23 NOTE — HISTORY OF PRESENT ILLNESS
[FreeTextEntry1] : Follow up of type 2 DM, Hashimoto's Thyroiditis, hyperlipidemia.  \par Has PMH with CAD with cardiomyopathy.  Recently had repeat assessment of EF, which is now up to 50%.   \par Recently had left carpal tunnel release.  \par Still under a lot of stress as his wife has early onset dementia.  \par \par Quality:  type 2 DM\par Severity:  moderate\par Duration of diabetes:  since 2009\par Associated Complications/ Symptoms:  nephropathy and neuropathy, CAD\par Modifying Factors:  Better with medication\par \par SMBG:  testing once a day. Reports occasional hypoglycemia if he skips a meal.  \par \par Current Diabetic Medication Regimen:\par Metformin ER 1000 mg BID\par Glimepiride 4 mg  BID\par Farxiga 10 mg daily-  complains of mild diffuse rash since starting this.  \par Tresiba 80 units qhs\par Used Novolog in the past, now off.   \par Victoza and Ozempic were too costly. \par Intolerant to Invokana (fatigue), Trulicity (chest pain and arthralgias).\par Intolerant to ARB (angioedema) and statins.\par \par  \par \par

## 2022-06-23 NOTE — DATA REVIEWED
[FreeTextEntry1] : LABS:\par 1/27/2022:\par UACR  928\par Trig 235\par LDL 63\par Creatinine 0.78\par A1c 6.9%\par TSH 2.34\par Free T4  1.1\par 25(OH)D  23

## 2022-06-23 NOTE — PHYSICAL EXAM
[Obese] : obese [No Acute Distress] : no acute distress [Normal Sclera/Conjunctiva] : normal sclera/conjunctiva [No Lid Lag] : no lid lag [No Neck Mass] : no neck mass was observed [No LAD] : no lymphadenopathy [Supple] : the neck was supple [Thyroid Not Enlarged] : the thyroid was not enlarged [No Thyroid Nodules] : no palpable thyroid nodules [No Respiratory Distress] : no respiratory distress [Clear to Auscultation] : lungs were clear to auscultation bilaterally [Normal S1, S2] : normal S1 and S2 [Normal Rate] : heart rate was normal [Regular Rhythm] : with a regular rhythm [Normal Affect] : the affect was normal [Normal Insight/Judgement] : insight and judgment were intact [Normal Mood] : the mood was normal [Acanthosis Nigricans] : no acanthosis nigricans [de-identified] : 2/6 systolic murmur

## 2022-06-23 NOTE — ASSESSMENT
[FreeTextEntry1] : 61  year old male with type 2 DM with associated proteinuria and CAD, Hashimoto's Thyroiditis, HTN, hyperlipidemia, and vitamin D deficiency.   His diabetes seems well controlled, but need labs to confirm.  \par \par 1.  Type 2 DM-    Continue Tresiba, Metformin, Glimepiride and Farxiga.  If rash continues, may want to consider changing Farxiga to Jardiance.  Check A1c and UACR now. \par 2.  Hashimoto's Thyroiditis-  Continue to monitor TFTs.\par 3.  HTN-   Continue current Rx.  Intolerant to ARB.\par 4.  Hyperlipidemia-   continue Atorvastatin.  \par 5.  Vitamin D deficiency-  Continue Vitamin D2 50,000 IU once a week.   Check level now.  \par \par

## 2022-06-24 ENCOUNTER — TRANSCRIPTION ENCOUNTER (OUTPATIENT)
Age: 62
End: 2022-06-24

## 2022-06-24 LAB
25(OH)D3 SERPL-MCNC: 14.6 NG/ML
ALBUMIN SERPL ELPH-MCNC: 5 G/DL
ALP BLD-CCNC: 63 U/L
ALT SERPL-CCNC: 26 U/L
ANION GAP SERPL CALC-SCNC: 17 MMOL/L
AST SERPL-CCNC: 29 U/L
BASOPHILS # BLD AUTO: 0.09 K/UL
BASOPHILS NFR BLD AUTO: 1 %
BILIRUB SERPL-MCNC: 0.6 MG/DL
BUN SERPL-MCNC: 20 MG/DL
CALCIUM SERPL-MCNC: 9.4 MG/DL
CHLORIDE SERPL-SCNC: 98 MMOL/L
CHOLEST SERPL-MCNC: 132 MG/DL
CO2 SERPL-SCNC: 21 MMOL/L
CREAT SERPL-MCNC: 0.81 MG/DL
CREAT SPEC-SCNC: 77 MG/DL
EGFR: 99 ML/MIN/1.73M2
EOSINOPHIL # BLD AUTO: 0.31 K/UL
EOSINOPHIL NFR BLD AUTO: 3.3 %
ESTIMATED AVERAGE GLUCOSE: 171 MG/DL
GLUCOSE SERPL-MCNC: 160 MG/DL
HBA1C MFR BLD HPLC: 7.6 %
HCT VFR BLD CALC: 53.9 %
HDLC SERPL-MCNC: 44 MG/DL
HGB BLD-MCNC: 17.6 G/DL
IMM GRANULOCYTES NFR BLD AUTO: 0.5 %
LDLC SERPL CALC-MCNC: 46 MG/DL
LYMPHOCYTES # BLD AUTO: 1.33 K/UL
LYMPHOCYTES NFR BLD AUTO: 14.3 %
MAN DIFF?: NORMAL
MCHC RBC-ENTMCNC: 31.6 PG
MCHC RBC-ENTMCNC: 32.7 GM/DL
MCV RBC AUTO: 96.8 FL
MICROALBUMIN 24H UR DL<=1MG/L-MCNC: 50.5 MG/DL
MICROALBUMIN/CREAT 24H UR-RTO: 657 MG/G
MONOCYTES # BLD AUTO: 1.09 K/UL
MONOCYTES NFR BLD AUTO: 11.7 %
NEUTROPHILS # BLD AUTO: 6.43 K/UL
NEUTROPHILS NFR BLD AUTO: 69.2 %
NONHDLC SERPL-MCNC: 88 MG/DL
PLATELET # BLD AUTO: 254 K/UL
POTASSIUM SERPL-SCNC: 5.4 MMOL/L
PROT SERPL-MCNC: 7.9 G/DL
RBC # BLD: 5.57 M/UL
RBC # FLD: 13.5 %
SODIUM SERPL-SCNC: 136 MMOL/L
TRIGL SERPL-MCNC: 206 MG/DL
TSH SERPL-ACNC: 1.4 UIU/ML
WBC # FLD AUTO: 9.3 K/UL

## 2022-10-06 ENCOUNTER — APPOINTMENT (OUTPATIENT)
Dept: ORTHOPEDIC SURGERY | Facility: CLINIC | Age: 62
End: 2022-10-06

## 2022-10-06 VITALS — WEIGHT: 315 LBS | BODY MASS INDEX: 39.17 KG/M2 | HEIGHT: 75 IN

## 2022-10-06 PROCEDURE — 73110 X-RAY EXAM OF WRIST: CPT | Mod: LT

## 2022-10-06 PROCEDURE — 99204 OFFICE O/P NEW MOD 45 MIN: CPT

## 2022-10-06 NOTE — DISCUSSION/SUMMARY
[Surgical risks reviewed] : Surgical risks reviewed [de-identified] : DIscussed his extensive pressure that he puts on his hand due to back problem may very well be the causative factor.  Will obtain MRi to eval nerve ans surrounding anatomy.  Discussed guarded prognosis

## 2022-10-06 NOTE — PHYSICAL EXAM
[Left] : left wrist [FreeTextEntry3] : LEFT FDI atrophy\par Extensive callous over hypothenar eminence.\par FDI 3+/5\par Dorasl ulnar hand without sensory deficit. [FreeTextEntry8] : mild DRUJ and PT arthritis, stage 3 first CMC arthritis

## 2022-10-06 NOTE — HISTORY OF PRESENT ILLNESS
[Tingling] : tingling [Constant] : constant [Household chores] : household chores [0] : 0 [de-identified] : 62 year old male with complaints of.  He is 4 months s/p LEFT CTR.  Has had symptoms of numbness and tingling radual LEFT hand since 2015.  Tried splinting at night.  Eventually lack of relief, underwent surgery June 2022.  Symptoms of numbness were present 24 hours/day prior to surgery.  These symptoms (24 hours/day) present for 4 years.    Surgeyr did relieve night time symptoms.  \par He is planned for LEFT elbow ulnar nerve surgery.. [] : no [FreeTextEntry1] : left hand/fingers  [FreeTextEntry5] : Patient states he had carpal tunnel surgery with Dr Blanco and after removing splint he feel no feeling on the left hand/fingers  [FreeTextEntry6] : numbness  [FreeTextEntry7] : Left hand/middle, pointer finger  [de-identified] : picking up items  [de-identified] : 9/22 [de-identified] : Dr Blanco  [de-identified] : 6/20/22 [de-identified] : [facility]  Burow's Advancement Flap Text: The defect edges were debeveled with a #15 scalpel blade.  Given the location of the defect and the proximity to free margins a Burow's advancement flap was deemed most appropriate.  Using a sterile surgical marker, the appropriate advancement flap was drawn incorporating the defect and placing the expected incisions within the relaxed skin tension lines where possible.    The area thus outlined was incised deep to adipose tissue with a #15 scalpel blade.  The skin margins were undermined to an appropriate distance in all directions utilizing iris scissors.

## 2022-10-11 ENCOUNTER — RESULT REVIEW (OUTPATIENT)
Age: 62
End: 2022-10-11

## 2022-10-17 ENCOUNTER — APPOINTMENT (OUTPATIENT)
Dept: ORTHOPEDIC SURGERY | Facility: CLINIC | Age: 62
End: 2022-10-17

## 2022-10-17 VITALS — BODY MASS INDEX: 39.17 KG/M2 | WEIGHT: 315 LBS | HEIGHT: 75 IN

## 2022-10-17 PROCEDURE — 99214 OFFICE O/P EST MOD 30 MIN: CPT

## 2022-10-17 NOTE — DISCUSSION/SUMMARY
[Surgical risks reviewed] : Surgical risks reviewed [de-identified] : DIscussed his extensive pressure that he puts on his hand due to back problem may very well be the causative factor.  Will obtain MRi to eval nerve ans surrounding anatomy.  Discussed guarded prognosis.  \par Diuscussed observation vs surgical intervention.  With declining function, will proceed wtith yanr nreve deconpression at the wrist

## 2022-10-17 NOTE — PHYSICAL EXAM
[Left] : left wrist [FreeTextEntry3] : LEFT FDI atrophy\par Extensive callous over hypothenar eminence.\par FDI 3+/5\par Dorasl ulnar hand without sensory deficit. [FreeTextEntry8] : MRI reviewed and agree.  Cyst with likely predssure on ulnar nerve at the wrist.

## 2022-10-17 NOTE — HISTORY OF PRESENT ILLNESS
[de-identified] : 62 year old male with complaints of.  He is 4 months s/p LEFT CTR.  Has had symptoms of numbness and tingling radual LEFT hand since 2015.  Tried splinting at night.  Eventually lack of relief, underwent surgery June 2022.  Symptoms of numbness were present 24 hours/day prior to surgery.  These symptoms (24 hours/day) present for 4 years.    Surgeyr did relieve night time symptoms.  \par He is planned for LEFT elbow ulnar nerve surgery.. [0] : 0 [Tingling] : tingling [Constant] : constant [Household chores] : household chores [] : no [FreeTextEntry1] : left hand/fingers  [FreeTextEntry5] : Patient states he had carpal tunnel surgery with Dr Blanco and after removing splint he feel no feeling on the left hand/fingers  [FreeTextEntry6] : numbness  [FreeTextEntry7] : Left hand/middle, pointer finger  [de-identified] : picking up items  [de-identified] : 9/22 [de-identified] : Dr Blanco  [de-identified] : 6/20/22 [de-identified] : [facility]  [de-identified] : [facility]

## 2022-11-04 ENCOUNTER — LABORATORY RESULT (OUTPATIENT)
Age: 62
End: 2022-11-04

## 2022-11-10 ENCOUNTER — APPOINTMENT (OUTPATIENT)
Dept: ENDOCRINOLOGY | Facility: CLINIC | Age: 62
End: 2022-11-10

## 2022-11-18 ENCOUNTER — LABORATORY RESULT (OUTPATIENT)
Age: 62
End: 2022-11-18

## 2022-11-22 ENCOUNTER — RESULT REVIEW (OUTPATIENT)
Age: 62
End: 2022-11-22

## 2022-11-23 ENCOUNTER — APPOINTMENT (OUTPATIENT)
Age: 62
End: 2022-11-23

## 2022-11-23 PROCEDURE — 64719 REVISE ULNAR NERVE AT WRIST: CPT | Mod: LT

## 2022-12-05 ENCOUNTER — APPOINTMENT (OUTPATIENT)
Dept: ORTHOPEDIC SURGERY | Facility: CLINIC | Age: 62
End: 2022-12-05

## 2022-12-05 VITALS — HEIGHT: 75 IN | BODY MASS INDEX: 39.17 KG/M2 | WEIGHT: 315 LBS

## 2022-12-05 PROCEDURE — 99024 POSTOP FOLLOW-UP VISIT: CPT

## 2022-12-05 NOTE — HISTORY OF PRESENT ILLNESS
[2] : 2 [0] : 0 [Dull/Aching] : dull/aching [Occasional] : occasional [Rest] : rest [Meds] : meds [de-identified] : 62 year old male presenting 12 days s/p  LEFT WRIST ULNA NERVE DECOMPRESSION and ganglion excision. His numbness in the little finger has subsided.\par DOS: 11/23/22 \par *Pathology: ganglion cyst  [] : no [FreeTextEntry1] : Left hand  [FreeTextEntry9] : Tylenol [de-identified] : grabbing [de-identified] : 10/17/22

## 2022-12-08 ENCOUNTER — APPOINTMENT (OUTPATIENT)
Dept: ORTHOPEDIC SURGERY | Facility: CLINIC | Age: 62
End: 2022-12-08

## 2022-12-12 ENCOUNTER — APPOINTMENT (OUTPATIENT)
Dept: ORTHOPEDIC SURGERY | Facility: CLINIC | Age: 62
End: 2022-12-12

## 2022-12-12 VITALS — HEIGHT: 75 IN | WEIGHT: 315 LBS | BODY MASS INDEX: 39.17 KG/M2

## 2022-12-12 PROCEDURE — 99024 POSTOP FOLLOW-UP VISIT: CPT

## 2022-12-12 NOTE — HISTORY OF PRESENT ILLNESS
[0] : 0 [Retired] : Work status: retired [2] : 2 [Dull/Aching] : dull/aching [Occasional] : occasional [Rest] : rest [Meds] : meds [de-identified] : 62 year old male presenting 19 days s/p  LEFT WRIST ULNA NERVE DECOMPRESSION and ganglion excision. His numbness in the little finger has subsided.  He had episode of bleeding after suture remoaval.  Was seen in urgent care, where new bandage was palced.\par DOS: 11/23/22 \par *Pathology: ganglion cyst  [] : no [FreeTextEntry1] : Left hand  [FreeTextEntry6] : numbness [FreeTextEntry9] : Tylenol [de-identified] : grabbing [de-identified] : none [de-identified] : 10/17/22

## 2022-12-12 NOTE — PHYSICAL EXAM
[Left] : left hand [FreeTextEntry3] : Wound hgealing well.  No isgns of infection.  Eschar at middle aspect of wound

## 2022-12-12 NOTE — DISCUSSION/SUMMARY
[de-identified] : Local wound care discussed.  Soap and water.  No heavy lifting.  Early return crieria discussed\par RTO 2 weeks

## 2022-12-26 ENCOUNTER — APPOINTMENT (OUTPATIENT)
Dept: ORTHOPEDIC SURGERY | Facility: CLINIC | Age: 62
End: 2022-12-26

## 2022-12-26 VITALS — WEIGHT: 315 LBS | BODY MASS INDEX: 39.17 KG/M2 | HEIGHT: 75 IN

## 2022-12-26 PROCEDURE — 99024 POSTOP FOLLOW-UP VISIT: CPT

## 2022-12-26 NOTE — HISTORY OF PRESENT ILLNESS
[5] : 5 [2] : 2 [] : Post Surgical Visit: yes [de-identified] : 62 year old male presenting 4 weeks s/p LEFT WRIST ULNA NERVE DECOMPRESSION and ganglion excision. \par DOS: 11/23/22  [FreeTextEntry1] : left wrist  [de-identified] : 11/23/22 [de-identified] : LEFT WRIST ULNA NERVE DECOMPRESSION and ganglion excision.

## 2022-12-26 NOTE — PHYSICAL EXAM
[Left] : left hand [] : no sign of infection Scribe Attestation (For Scribes USE Only)... [FreeTextEntry3] : small central eschar, otherwise healed  DISPLAY PLAN FREE TEXT DISPLAY PLAN FREE TEXT DISPLAY PLAN FREE TEXT DISPLAY PLAN FREE TEXT DISPLAY PLAN FREE TEXT DISPLAY PLAN FREE TEXT DISPLAY PLAN FREE TEXT DISPLAY PLAN FREE TEXT DISPLAY PLAN FREE TEXT DISPLAY PLAN FREE TEXT DISPLAY PLAN FREE TEXT Attending Attestation (For Attendings USE Only).../Scribe Attestation (For Scribes USE Only)...

## 2023-01-19 ENCOUNTER — RX RENEWAL (OUTPATIENT)
Age: 63
End: 2023-01-19

## 2023-01-23 ENCOUNTER — APPOINTMENT (OUTPATIENT)
Dept: ORTHOPEDIC SURGERY | Facility: CLINIC | Age: 63
End: 2023-01-23
Payer: MEDICARE

## 2023-01-23 VITALS — HEIGHT: 75 IN | BODY MASS INDEX: 39.17 KG/M2 | WEIGHT: 315 LBS

## 2023-01-23 DIAGNOSIS — G56.22 LESION OF ULNAR NERVE, LEFT UPPER LIMB: ICD-10-CM

## 2023-01-23 PROCEDURE — 99024 POSTOP FOLLOW-UP VISIT: CPT

## 2023-01-23 NOTE — HISTORY OF PRESENT ILLNESS
[Radiating] : radiating [Sharp] : sharp [Shooting] : shooting [Constant] : constant [Household chores] : household chores [Leisure] : leisure [de-identified] : 62 year old male presenting 2 months s/p  LEFT WRIST ULNA NERVE DECOMPRESSION and ganglion excision. He feels some slight improvement. \par DOS: 11/23/22 \par  [] : no [FreeTextEntry1] : Left wrist [FreeTextEntry6] : weakness, numbness in the hand  [FreeTextEntry7] : hand [de-identified] : attempting to lift or grab things  [de-identified] : 11/23/22 [de-identified] : LEFT WRIST ULNA NERVE DECOMPRESSION and ganglion excision. \par

## 2023-01-28 ENCOUNTER — TRANSCRIPTION ENCOUNTER (OUTPATIENT)
Age: 63
End: 2023-01-28

## 2023-03-06 ENCOUNTER — APPOINTMENT (OUTPATIENT)
Dept: ORTHOPEDIC SURGERY | Facility: CLINIC | Age: 63
End: 2023-03-06

## 2023-03-27 ENCOUNTER — LABORATORY RESULT (OUTPATIENT)
Age: 63
End: 2023-03-27

## 2023-06-12 ENCOUNTER — LABORATORY RESULT (OUTPATIENT)
Age: 63
End: 2023-06-12

## 2023-06-12 ENCOUNTER — APPOINTMENT (OUTPATIENT)
Dept: ENDOCRINOLOGY | Facility: CLINIC | Age: 63
End: 2023-06-12
Payer: MEDICARE

## 2023-06-12 VITALS
HEIGHT: 75 IN | SYSTOLIC BLOOD PRESSURE: 128 MMHG | DIASTOLIC BLOOD PRESSURE: 82 MMHG | OXYGEN SATURATION: 96 % | WEIGHT: 315 LBS | BODY MASS INDEX: 39.17 KG/M2 | HEART RATE: 93 BPM

## 2023-06-12 DIAGNOSIS — Z87.09 PERSONAL HISTORY OF OTHER DISEASES OF THE RESPIRATORY SYSTEM: ICD-10-CM

## 2023-06-12 DIAGNOSIS — E78.5 HYPERLIPIDEMIA, UNSPECIFIED: ICD-10-CM

## 2023-06-12 DIAGNOSIS — Z86.79 PERSONAL HISTORY OF OTHER DISEASES OF THE CIRCULATORY SYSTEM: ICD-10-CM

## 2023-06-12 DIAGNOSIS — Z99.89 OBSTRUCTIVE SLEEP APNEA (ADULT) (PEDIATRIC): ICD-10-CM

## 2023-06-12 DIAGNOSIS — G47.33 OBSTRUCTIVE SLEEP APNEA (ADULT) (PEDIATRIC): ICD-10-CM

## 2023-06-12 LAB — GLUCOSE BLDC GLUCOMTR-MCNC: 162

## 2023-06-12 PROCEDURE — 99214 OFFICE O/P EST MOD 30 MIN: CPT | Mod: 25

## 2023-06-12 PROCEDURE — 82962 GLUCOSE BLOOD TEST: CPT

## 2023-06-12 RX ORDER — GABAPENTIN 100 MG
100 TABLET ORAL
Refills: 0 | Status: DISCONTINUED | COMMUNITY
End: 2023-06-12

## 2023-06-12 RX ORDER — DAPAGLIFLOZIN 10 MG/1
10 TABLET, FILM COATED ORAL DAILY
Qty: 90 | Refills: 1 | Status: ACTIVE | COMMUNITY
Start: 2022-02-16 | End: 1900-01-01

## 2023-06-12 RX ORDER — GABAPENTIN 100 MG/1
100 CAPSULE ORAL
Qty: 90 | Refills: 0 | Status: ACTIVE | COMMUNITY
Start: 2022-11-23

## 2023-06-12 RX ORDER — ERGOCALCIFEROL 1.25 MG/1
1.25 MG CAPSULE, LIQUID FILLED ORAL
Qty: 18 | Refills: 0 | Status: DISCONTINUED | COMMUNITY
Start: 2019-09-06 | End: 2023-06-12

## 2023-06-12 RX ORDER — CHOLECALCIFEROL (VITAMIN D3) 25 MCG
TABLET ORAL
Refills: 0 | Status: ACTIVE | COMMUNITY

## 2023-06-12 RX ORDER — FUROSEMIDE 40 MG/1
40 TABLET ORAL
Qty: 90 | Refills: 0 | Status: ACTIVE | COMMUNITY
Start: 2023-04-10

## 2023-06-12 RX ORDER — FUROSEMIDE 20 MG/1
20 TABLET ORAL
Refills: 0 | Status: DISCONTINUED | COMMUNITY
End: 2023-06-12

## 2023-06-12 RX ORDER — METFORMIN ER 500 MG 500 MG/1
500 TABLET ORAL
Qty: 360 | Refills: 0 | Status: DISCONTINUED | COMMUNITY
Start: 2018-10-11 | End: 2023-06-12

## 2023-06-12 NOTE — PHYSICAL EXAM
[Obese] : obese [No Acute Distress] : no acute distress [Normal Sclera/Conjunctiva] : normal sclera/conjunctiva [No Lid Lag] : no lid lag [No Neck Mass] : no neck mass was observed [No LAD] : no lymphadenopathy [Supple] : the neck was supple [Thyroid Not Enlarged] : the thyroid was not enlarged [No Thyroid Nodules] : no palpable thyroid nodules [No Respiratory Distress] : no respiratory distress [Clear to Auscultation] : lungs were clear to auscultation bilaterally [Normal S1, S2] : normal S1 and S2 [Normal Rate] : heart rate was normal [Regular Rhythm] : with a regular rhythm [Acanthosis Nigricans] : no acanthosis nigricans [Normal Affect] : the affect was normal [Normal Insight/Judgement] : insight and judgment were intact [Normal Mood] : the mood was normal [de-identified] : 3/6 systolic murmur [de-identified] : Trace LE edema (b/l)

## 2023-06-12 NOTE — HISTORY OF PRESENT ILLNESS
[FreeTextEntry1] : Follow up of type 2 DM, Hashimoto's Thyroiditis, hyperlipidemia.  \par Has PMH with CAD with cardiomyopathy.  \par Was recently diagnosed with ILD and started on O2 therapy.  Also has SUSIE on CPAP.\par Had worsened CHF and diuretics were increased and lost 30 pounds with much less edema.   \par \par Quality:  type 2 DM\par Severity:  moderate\par Duration of diabetes:  since 2009\par Associated Complications/ Symptoms:  nephropathy and neuropathy, CAD\par Modifying Factors:  Better with medication\par \par SMBG:  testing once a day.   Having some episodes of hypoglycemia in the late afternoon, but admits to skipping meals until dinner time.  \par \par Current Diabetic Medication Regimen:\par Metformin  mg in AM and 1000 mg in PM\par Glimepiride 4 mg  BID\par Farxiga 10 mg daily  \par Stopped takign Tresiba due to hypoglycemia (off for 2 months now)\par Used Novolog in the past, now off.   \par Victoza and Ozempic were too costly. \par Intolerant to Invokana (fatigue), Trulicity (chest pain and arthralgias).\par Intolerant to ARB (angioedema) and statins.\par \par  \par \par

## 2023-06-12 NOTE — REVIEW OF SYSTEMS
[Recent Weight Loss (___ Lbs)] : recent weight loss: [unfilled] lbs [Lower Ext Edema] : lower extremity edema [Shortness Of Breath] : no shortness of breath

## 2023-06-12 NOTE — ASSESSMENT
[FreeTextEntry1] : 61  year old male with type 2 DM with associated proteinuria and CAD, Hashimoto's Thyroiditis, HTN, hyperlipidemia, and vitamin D deficiency.  \par \par 1.  Type 2 DM-     Due to CHF and hypoxia, he is no longer a candidate for metformin therapy due to the risk of lactic acidosis.   D/C Metformin and Will start Ozempic.   Continue Glimepiride and Farxiga.  Ok to stay off insulin therapy for now.  Discussed strategies to avoid hypoglycemia while using sulfonylurea including eating at regular intervals.   Check labs now.   \par 2.  Hashimoto's Thyroiditis-   Has been euthyroid, check TFTs now.  \par 3.  HTN-   Continue current Rx.  Intolerant to ARB (angioedema).\par 4.  Hyperlipidemia-   continue Atorvastatin.  \par 5.  Vitamin D deficiency-  Continue supplement.  Check level now.   \par \par Follow up in 3-4 months.

## 2023-06-13 LAB
ALBUMIN SERPL ELPH-MCNC: 5.1 G/DL
ALP BLD-CCNC: 71 U/L
ALT SERPL-CCNC: 24 U/L
ANION GAP SERPL CALC-SCNC: 16 MMOL/L
AST SERPL-CCNC: 29 U/L
BILIRUB SERPL-MCNC: 0.6 MG/DL
BUN SERPL-MCNC: 19 MG/DL
CALCIUM SERPL-MCNC: 10.2 MG/DL
CHLORIDE SERPL-SCNC: 100 MMOL/L
CHOLEST SERPL-MCNC: 168 MG/DL
CO2 SERPL-SCNC: 25 MMOL/L
CREAT SERPL-MCNC: 0.85 MG/DL
CREAT SPEC-SCNC: 53 MG/DL
EGFR: 97 ML/MIN/1.73M2
ESTIMATED AVERAGE GLUCOSE: 171 MG/DL
GLUCOSE SERPL-MCNC: 156 MG/DL
HBA1C MFR BLD HPLC: 7.6 %
HDLC SERPL-MCNC: 46 MG/DL
LDLC SERPL CALC-MCNC: 81 MG/DL
MICROALBUMIN 24H UR DL<=1MG/L-MCNC: 18.1 MG/DL
MICROALBUMIN/CREAT 24H UR-RTO: 345 MG/G
NONHDLC SERPL-MCNC: 122 MG/DL
POTASSIUM SERPL-SCNC: 4.8 MMOL/L
PROT SERPL-MCNC: 8.3 G/DL
SODIUM SERPL-SCNC: 141 MMOL/L
T4 FREE SERPL-MCNC: 1 NG/DL
TRIGL SERPL-MCNC: 208 MG/DL
TSH SERPL-ACNC: 1.5 UIU/ML

## 2023-06-22 ENCOUNTER — APPOINTMENT (OUTPATIENT)
Dept: ORTHOPEDIC SURGERY | Facility: CLINIC | Age: 63
End: 2023-06-22
Payer: MEDICARE

## 2023-06-22 VITALS — WEIGHT: 315 LBS | HEIGHT: 75 IN | BODY MASS INDEX: 39.17 KG/M2

## 2023-06-22 DIAGNOSIS — M70.21 OLECRANON BURSITIS, RIGHT ELBOW: ICD-10-CM

## 2023-06-22 PROCEDURE — 99213 OFFICE O/P EST LOW 20 MIN: CPT

## 2023-06-22 NOTE — HISTORY OF PRESENT ILLNESS
[1] : 2 [0] : 0 [Throbbing] : throbbing [Nothing helps with pain getting better] : Nothing helps with pain getting better [Retired] : Work status: retired [de-identified] : 62 year old male presenting with RIGHT elbow swelling for the past 6 weeks ago. No injury/trauma. He states he has a history of gout. Has improved since onset. \par  [] : no [FreeTextEntry1] : left elbow  [FreeTextEntry3] : 6 weeks ago  [FreeTextEntry5] : insidious onset  [FreeTextEntry6] : swelling

## 2023-06-22 NOTE — PHYSICAL EXAM
[Right] : right elbow [] : moderate olecranon bursal effusion [FreeTextEntry3] : swelling over posterior right elbow, mildly tenderness

## 2023-06-22 NOTE — DISCUSSION/SUMMARY
[de-identified] : Discussed the nature of the diagnosis and risk and benefits of different modalities of treatment.\par No signs of infection. \par He is improving, no intervention at this time. \par Warm compress and NSAIDs. \par PRN

## 2023-09-28 ENCOUNTER — RX RENEWAL (OUTPATIENT)
Age: 63
End: 2023-09-28

## 2023-12-05 RX ORDER — SEMAGLUTIDE 0.68 MG/ML
2 INJECTION, SOLUTION SUBCUTANEOUS
Qty: 3 | Refills: 0 | Status: ACTIVE | COMMUNITY
Start: 2023-06-12 | End: 1900-01-01

## 2023-12-28 ENCOUNTER — APPOINTMENT (OUTPATIENT)
Dept: ENDOCRINOLOGY | Facility: CLINIC | Age: 63
End: 2023-12-28
Payer: MEDICARE

## 2023-12-28 VITALS
SYSTOLIC BLOOD PRESSURE: 120 MMHG | HEIGHT: 75 IN | RESPIRATION RATE: 16 BRPM | TEMPERATURE: 98 F | HEART RATE: 90 BPM | OXYGEN SATURATION: 98 % | BODY MASS INDEX: 36.8 KG/M2 | WEIGHT: 296 LBS | DIASTOLIC BLOOD PRESSURE: 80 MMHG

## 2023-12-28 DIAGNOSIS — E78.2 MIXED HYPERLIPIDEMIA: ICD-10-CM

## 2023-12-28 DIAGNOSIS — I10 ESSENTIAL (PRIMARY) HYPERTENSION: ICD-10-CM

## 2023-12-28 DIAGNOSIS — E06.3 AUTOIMMUNE THYROIDITIS: ICD-10-CM

## 2023-12-28 DIAGNOSIS — E11.29 TYPE 2 DIABETES MELLITUS WITH OTHER DIABETIC KIDNEY COMPLICATION: ICD-10-CM

## 2023-12-28 DIAGNOSIS — E55.9 VITAMIN D DEFICIENCY, UNSPECIFIED: ICD-10-CM

## 2023-12-28 PROCEDURE — 99214 OFFICE O/P EST MOD 30 MIN: CPT

## 2023-12-28 RX ORDER — LANCETS
EACH MISCELLANEOUS
Qty: 100 | Refills: 3 | Status: ACTIVE | COMMUNITY
Start: 2019-03-21 | End: 1900-01-01

## 2023-12-28 RX ORDER — PEN NEEDLE, DIABETIC 29 G X1/2"
31G X 5 MM NEEDLE, DISPOSABLE MISCELLANEOUS
Qty: 1 | Refills: 3 | Status: DISCONTINUED | COMMUNITY
Start: 2018-08-31 | End: 2023-12-28

## 2023-12-28 RX ORDER — OXYCODONE 5 MG/1
5 TABLET ORAL
Qty: 5 | Refills: 0 | Status: DISCONTINUED | COMMUNITY
Start: 2022-11-23 | End: 2023-12-28

## 2023-12-28 RX ORDER — BLOOD SUGAR DIAGNOSTIC
STRIP MISCELLANEOUS
Qty: 100 | Refills: 3 | Status: ACTIVE | COMMUNITY
Start: 2019-11-08 | End: 1900-01-01

## 2023-12-28 RX ORDER — INSULIN DEGLUDEC INJECTION 200 U/ML
200 INJECTION, SOLUTION SUBCUTANEOUS
Qty: 3 | Refills: 1 | Status: DISCONTINUED | COMMUNITY
Start: 2018-08-31 | End: 2023-12-28

## 2023-12-28 NOTE — ASSESSMENT
[FreeTextEntry1] : 61 year old male with type 2 DM with associated proteinuria and CAD, CHF, ILD, Hashimoto's Thyroiditis, HTN, hyperlipidemia, and vitamin D deficiency here for follow up.     1. Type 2 DM-  Continue Glimepiride, Ozempic and Farxiga.  Check labs now.    2. Hashimoto's Thyroiditis-  check TFTs now. 3. HTN- Continue current Rx. Intolerant to ARB (angioedema). 4. Hyperlipidemia- continue Atorvastatin.  Follow up in 4 months.

## 2023-12-28 NOTE — DATA REVIEWED
[FreeTextEntry1] : LABS: 1/27/2022: UACR  928 Trig 235 LDL 63 Creatinine 0.78 A1c 6.9% TSH 2.34 Free T4  1.1 25(OH)D  23

## 2023-12-28 NOTE — REVIEW OF SYSTEMS
[Recent Weight Loss (___ Lbs)] : recent weight loss: [unfilled] lbs [Shortness Of Breath] : no shortness of breath [Nausea] : no nausea [Constipation] : no constipation

## 2023-12-28 NOTE — HISTORY OF PRESENT ILLNESS
[FreeTextEntry1] : Follow up of type 2 DM, Hashimoto's Thyroiditis, hyperlipidemia.   Has PMH with CAD with cardiomyopathy, ILD on O2 therapy, SUSIE on CPAP.  Quality:  type 2 DM Severity:  moderate Duration of diabetes:  since 2009 Associated Complications/ Symptoms:  nephropathy and neuropathy, CAD Modifying Factors:  Better with medication  SMBG:  testing once a day.      Current Diabetic Medication Regimen: Ozempic 0.5 mg qweek. Glimepiride 4 mg  BID Farxiga 10 mg daily   Stopped taking Tresiba in the past due to hypoglycemia  Used Novolog in the past, now off.    Came off Metformin in the past due to CHF and Hypoxia.    Intolerant to Invokana (fatigue), Trulicity (chest pain and arthralgias). Intolerant to ARB (angioedema) and statins.   Tolerating ozempic well.  Losing some weight.

## 2023-12-28 NOTE — PHYSICAL EXAM
[Obese] : obese [No Acute Distress] : no acute distress [Normal Sclera/Conjunctiva] : normal sclera/conjunctiva [No Lid Lag] : no lid lag [No Neck Mass] : no neck mass was observed [No LAD] : no lymphadenopathy [Supple] : the neck was supple [Thyroid Not Enlarged] : the thyroid was not enlarged [No Thyroid Nodules] : no palpable thyroid nodules [No Respiratory Distress] : no respiratory distress [Clear to Auscultation] : lungs were clear to auscultation bilaterally [Normal S1, S2] : normal S1 and S2 [Normal Rate] : heart rate was normal [Regular Rhythm] : with a regular rhythm [Acanthosis Nigricans] : no acanthosis nigricans [Normal Affect] : the affect was normal [Normal Insight/Judgement] : insight and judgment were intact [Normal Mood] : the mood was normal [de-identified] : 3/6 systolic murmur

## 2023-12-29 LAB
ALBUMIN SERPL ELPH-MCNC: 4.9 G/DL
ALP BLD-CCNC: 64 U/L
ALT SERPL-CCNC: 36 U/L
ANION GAP SERPL CALC-SCNC: 15 MMOL/L
AST SERPL-CCNC: 33 U/L
BASOPHILS # BLD AUTO: 0.1 K/UL
BASOPHILS NFR BLD AUTO: 1 %
BILIRUB SERPL-MCNC: 0.6 MG/DL
BUN SERPL-MCNC: 19 MG/DL
CALCIUM SERPL-MCNC: 10 MG/DL
CHLORIDE SERPL-SCNC: 99 MMOL/L
CHOLEST SERPL-MCNC: 129 MG/DL
CO2 SERPL-SCNC: 24 MMOL/L
CREAT SERPL-MCNC: 0.89 MG/DL
CREAT SPEC-SCNC: 80 MG/DL
EGFR: 95 ML/MIN/1.73M2
EOSINOPHIL # BLD AUTO: 0.39 K/UL
EOSINOPHIL NFR BLD AUTO: 3.8 %
ESTIMATED AVERAGE GLUCOSE: 146 MG/DL
GLUCOSE SERPL-MCNC: 144 MG/DL
HBA1C MFR BLD HPLC: 6.7 %
HCT VFR BLD CALC: 49.9 %
HDLC SERPL-MCNC: 39 MG/DL
HGB BLD-MCNC: 17.2 G/DL
IMM GRANULOCYTES NFR BLD AUTO: 0.3 %
LDLC SERPL CALC-MCNC: 64 MG/DL
LYMPHOCYTES # BLD AUTO: 1.58 K/UL
LYMPHOCYTES NFR BLD AUTO: 15.5 %
MAN DIFF?: NORMAL
MCHC RBC-ENTMCNC: 34.1 PG
MCHC RBC-ENTMCNC: 34.5 GM/DL
MCV RBC AUTO: 98.8 FL
MICROALBUMIN 24H UR DL<=1MG/L-MCNC: 8.1 MG/DL
MICROALBUMIN/CREAT 24H UR-RTO: 101 MG/G
MONOCYTES # BLD AUTO: 1.04 K/UL
MONOCYTES NFR BLD AUTO: 10.2 %
NEUTROPHILS # BLD AUTO: 7.06 K/UL
NEUTROPHILS NFR BLD AUTO: 69.2 %
NONHDLC SERPL-MCNC: 90 MG/DL
PLATELET # BLD AUTO: 248 K/UL
POTASSIUM SERPL-SCNC: 4.6 MMOL/L
PROT SERPL-MCNC: 8.4 G/DL
RBC # BLD: 5.05 M/UL
RBC # FLD: 13.7 %
SODIUM SERPL-SCNC: 138 MMOL/L
T4 FREE SERPL-MCNC: 1.1 NG/DL
TRIGL SERPL-MCNC: 147 MG/DL
TSH SERPL-ACNC: 1.41 UIU/ML
WBC # FLD AUTO: 10.2 K/UL

## 2024-04-16 ENCOUNTER — APPOINTMENT (OUTPATIENT)
Dept: ENDOCRINOLOGY | Facility: CLINIC | Age: 64
End: 2024-04-16

## 2024-12-19 ENCOUNTER — APPOINTMENT (OUTPATIENT)
Dept: ENDOCRINOLOGY | Facility: CLINIC | Age: 64
End: 2024-12-19

## 2025-02-24 ENCOUNTER — APPOINTMENT (OUTPATIENT)
Dept: ENDOCRINOLOGY | Facility: CLINIC | Age: 65
End: 2025-02-24
Payer: MEDICARE

## 2025-02-24 VITALS
DIASTOLIC BLOOD PRESSURE: 78 MMHG | WEIGHT: 312 LBS | HEART RATE: 81 BPM | BODY MASS INDEX: 38.79 KG/M2 | SYSTOLIC BLOOD PRESSURE: 120 MMHG | OXYGEN SATURATION: 82 % | HEIGHT: 75 IN

## 2025-02-24 DIAGNOSIS — E78.5 HYPERLIPIDEMIA, UNSPECIFIED: ICD-10-CM

## 2025-02-24 DIAGNOSIS — R80.9 PROTEINURIA, UNSPECIFIED: ICD-10-CM

## 2025-02-24 DIAGNOSIS — E11.29 TYPE 2 DIABETES MELLITUS WITH OTHER DIABETIC KIDNEY COMPLICATION: ICD-10-CM

## 2025-02-24 DIAGNOSIS — E06.3 AUTOIMMUNE THYROIDITIS: ICD-10-CM

## 2025-02-24 DIAGNOSIS — E78.2 MIXED HYPERLIPIDEMIA: ICD-10-CM

## 2025-02-24 DIAGNOSIS — I10 ESSENTIAL (PRIMARY) HYPERTENSION: ICD-10-CM

## 2025-02-24 DIAGNOSIS — E55.9 VITAMIN D DEFICIENCY, UNSPECIFIED: ICD-10-CM

## 2025-02-24 LAB — GLUCOSE BLDC GLUCOMTR-MCNC: 181

## 2025-02-24 PROCEDURE — 99214 OFFICE O/P EST MOD 30 MIN: CPT

## 2025-02-24 PROCEDURE — 82962 GLUCOSE BLOOD TEST: CPT

## 2025-02-24 PROCEDURE — 36415 COLL VENOUS BLD VENIPUNCTURE: CPT

## 2025-02-25 LAB
25(OH)D3 SERPL-MCNC: 13.7 NG/ML
ALBUMIN SERPL ELPH-MCNC: 4.5 G/DL
ALP BLD-CCNC: 62 U/L
ALT SERPL-CCNC: 22 U/L
ANION GAP SERPL CALC-SCNC: 15 MMOL/L
AST SERPL-CCNC: 25 U/L
BASOPHILS # BLD AUTO: 0.11 K/UL
BASOPHILS NFR BLD AUTO: 1.2 %
BILIRUB SERPL-MCNC: 0.5 MG/DL
BUN SERPL-MCNC: 19 MG/DL
CALCIUM SERPL-MCNC: 9.5 MG/DL
CHLORIDE SERPL-SCNC: 101 MMOL/L
CHOLEST SERPL-MCNC: 108 MG/DL
CO2 SERPL-SCNC: 22 MMOL/L
CREAT SERPL-MCNC: 0.87 MG/DL
CREAT SPEC-SCNC: 57 MG/DL
EGFR: 95 ML/MIN/1.73M2
EOSINOPHIL # BLD AUTO: 0.4 K/UL
EOSINOPHIL NFR BLD AUTO: 4.2 %
ESTIMATED AVERAGE GLUCOSE: 177 MG/DL
GLUCOSE SERPL-MCNC: 225 MG/DL
HBA1C MFR BLD HPLC: 7.8 %
HCT VFR BLD CALC: 55.3 %
HDLC SERPL-MCNC: 41 MG/DL
HGB BLD-MCNC: 18.4 G/DL
IMM GRANULOCYTES NFR BLD AUTO: 0.3 %
LDLC SERPL CALC-MCNC: 41 MG/DL
LYMPHOCYTES # BLD AUTO: 0.95 K/UL
LYMPHOCYTES NFR BLD AUTO: 10 %
MAN DIFF?: NORMAL
MCHC RBC-ENTMCNC: 32.3 PG
MCHC RBC-ENTMCNC: 33.3 G/DL
MCV RBC AUTO: 97 FL
MICROALBUMIN 24H UR DL<=1MG/L-MCNC: 23 MG/DL
MICROALBUMIN/CREAT 24H UR-RTO: 402 MG/G
MONOCYTES # BLD AUTO: 1.01 K/UL
MONOCYTES NFR BLD AUTO: 10.6 %
NEUTROPHILS # BLD AUTO: 7.01 K/UL
NEUTROPHILS NFR BLD AUTO: 73.7 %
NONHDLC SERPL-MCNC: 66 MG/DL
PLATELET # BLD AUTO: 209 K/UL
POTASSIUM SERPL-SCNC: 4.8 MMOL/L
PROT SERPL-MCNC: 7.3 G/DL
RBC # BLD: 5.7 M/UL
RBC # FLD: 13.5 %
SODIUM SERPL-SCNC: 139 MMOL/L
T4 FREE SERPL-MCNC: 1 NG/DL
TRIGL SERPL-MCNC: 148 MG/DL
TSH SERPL-ACNC: 1.3 UIU/ML
WBC # FLD AUTO: 9.51 K/UL

## 2025-02-25 RX ORDER — ERGOCALCIFEROL 1.25 MG/1
1.25 MG CAPSULE, LIQUID FILLED ORAL
Qty: 8 | Refills: 1 | Status: ACTIVE | COMMUNITY
Start: 2025-02-25 | End: 1900-01-01

## 2025-02-25 RX ORDER — EMPAGLIFLOZIN 10 MG/1
10 TABLET, FILM COATED ORAL
Qty: 90 | Refills: 0 | Status: ACTIVE | COMMUNITY
Start: 2025-02-25

## 2025-04-18 ENCOUNTER — RX RENEWAL (OUTPATIENT)
Age: 65
End: 2025-04-18

## 2025-07-02 ENCOUNTER — APPOINTMENT (OUTPATIENT)
Dept: ENDOCRINOLOGY | Facility: CLINIC | Age: 65
End: 2025-07-02

## 2025-07-08 ENCOUNTER — APPOINTMENT (OUTPATIENT)
Dept: PULMONOLOGY | Facility: CLINIC | Age: 65
End: 2025-07-08
Payer: MEDICARE

## 2025-07-08 VITALS
DIASTOLIC BLOOD PRESSURE: 82 MMHG | BODY MASS INDEX: 38.79 KG/M2 | HEART RATE: 65 BPM | RESPIRATION RATE: 16 BRPM | OXYGEN SATURATION: 85 % | WEIGHT: 312 LBS | SYSTOLIC BLOOD PRESSURE: 140 MMHG | HEIGHT: 75 IN

## 2025-07-08 PROCEDURE — 99205 OFFICE O/P NEW HI 60 MIN: CPT

## 2025-07-08 RX ORDER — DAPAGLIFLOZIN 10 MG/1
10 TABLET, FILM COATED ORAL
Refills: 0 | Status: ACTIVE | COMMUNITY

## 2025-07-08 RX ORDER — LEVOBUNOLOL HCL 0.25 %
0.25 DROPS OPHTHALMIC (EYE)
Refills: 0 | Status: ACTIVE | COMMUNITY

## 2025-07-08 RX ORDER — FLUTICASONE FUROATE, UMECLIDINIUM BROMIDE AND VILANTEROL TRIFENATATE 100; 62.5; 25 UG/1; UG/1; UG/1
100-62.5-25 POWDER RESPIRATORY (INHALATION)
Refills: 0 | Status: ACTIVE | COMMUNITY

## 2025-08-01 ENCOUNTER — NON-APPOINTMENT (OUTPATIENT)
Age: 65
End: 2025-08-01

## 2025-08-01 ENCOUNTER — RESULT CHARGE (OUTPATIENT)
Age: 65
End: 2025-08-01

## 2025-08-08 ENCOUNTER — APPOINTMENT (OUTPATIENT)
Dept: PULMONOLOGY | Facility: CLINIC | Age: 65
End: 2025-08-08
Payer: MEDICARE

## 2025-08-08 ENCOUNTER — APPOINTMENT (OUTPATIENT)
Dept: PULMONOLOGY | Facility: CLINIC | Age: 65
End: 2025-08-08

## 2025-08-08 ENCOUNTER — APPOINTMENT (OUTPATIENT)
Facility: CLINIC | Age: 65
End: 2025-08-08
Payer: MEDICARE

## 2025-08-08 VITALS
BODY MASS INDEX: 39.17 KG/M2 | RESPIRATION RATE: 16 BRPM | OXYGEN SATURATION: 92 % | WEIGHT: 315 LBS | DIASTOLIC BLOOD PRESSURE: 84 MMHG | TEMPERATURE: 98.1 F | HEIGHT: 75 IN | SYSTOLIC BLOOD PRESSURE: 132 MMHG | HEART RATE: 73 BPM

## 2025-08-08 DIAGNOSIS — M54.6 PAIN IN THORACIC SPINE: ICD-10-CM

## 2025-08-08 DIAGNOSIS — G89.29 PAIN IN THORACIC SPINE: ICD-10-CM

## 2025-08-08 PROCEDURE — 99215 OFFICE O/P EST HI 40 MIN: CPT | Mod: 25

## 2025-08-08 PROCEDURE — 94618 PULMONARY STRESS TESTING: CPT

## 2025-08-08 PROCEDURE — ZZZZZ: CPT

## 2025-08-08 PROCEDURE — 94010 BREATHING CAPACITY TEST: CPT

## 2025-08-08 PROCEDURE — 94726 PLETHYSMOGRAPHY LUNG VOLUMES: CPT

## 2025-08-08 PROCEDURE — 99205 OFFICE O/P NEW HI 60 MIN: CPT

## 2025-08-08 PROCEDURE — 94729 DIFFUSING CAPACITY: CPT

## 2025-08-08 RX ORDER — OXYCODONE 5 MG/1
5 TABLET ORAL
Qty: 1 | Refills: 0 | Status: ACTIVE | COMMUNITY
Start: 2025-08-08

## 2025-08-13 ENCOUNTER — NON-APPOINTMENT (OUTPATIENT)
Age: 65
End: 2025-08-13

## 2025-08-15 ENCOUNTER — APPOINTMENT (OUTPATIENT)
Dept: ENDOCRINOLOGY | Facility: CLINIC | Age: 65
End: 2025-08-15